# Patient Record
Sex: FEMALE | Race: WHITE | NOT HISPANIC OR LATINO | Employment: FULL TIME | URBAN - METROPOLITAN AREA
[De-identification: names, ages, dates, MRNs, and addresses within clinical notes are randomized per-mention and may not be internally consistent; named-entity substitution may affect disease eponyms.]

---

## 2023-05-10 LAB
BASOPHILS (10*3/UL) IN BLOOD BY AUTOMATED COUNT: 0.06 X10E9/L (ref 0–0.1)
BASOPHILS/100 LEUKOCYTES IN BLOOD BY AUTOMATED COUNT: 0.9 % (ref 0–2)
C REACTIVE PROTEIN (MG/L) IN SER/PLAS: <0.1 MG/DL
EOSINOPHILS (10*3/UL) IN BLOOD BY AUTOMATED COUNT: 0.21 X10E9/L (ref 0–0.7)
EOSINOPHILS/100 LEUKOCYTES IN BLOOD BY AUTOMATED COUNT: 3 % (ref 0–6)
ERYTHROCYTE DISTRIBUTION WIDTH (RATIO) BY AUTOMATED COUNT: 12.4 % (ref 11.5–14.5)
ERYTHROCYTE MEAN CORPUSCULAR HEMOGLOBIN CONCENTRATION (G/DL) BY AUTOMATED: 33 G/DL (ref 32–36)
ERYTHROCYTE MEAN CORPUSCULAR VOLUME (FL) BY AUTOMATED COUNT: 92 FL (ref 80–100)
ERYTHROCYTES (10*6/UL) IN BLOOD BY AUTOMATED COUNT: 4.43 X10E12/L (ref 4–5.2)
HEMATOCRIT (%) IN BLOOD BY AUTOMATED COUNT: 40.6 % (ref 36–46)
HEMOGLOBIN (G/DL) IN BLOOD: 13.4 G/DL (ref 12–16)
IMMATURE GRANULOCYTES/100 LEUKOCYTES IN BLOOD BY AUTOMATED COUNT: 0.4 % (ref 0–0.9)
LEUKOCYTES (10*3/UL) IN BLOOD BY AUTOMATED COUNT: 7 X10E9/L (ref 4.4–11.3)
LYMPHOCYTES (10*3/UL) IN BLOOD BY AUTOMATED COUNT: 2.4 X10E9/L (ref 1.2–4.8)
LYMPHOCYTES/100 LEUKOCYTES IN BLOOD BY AUTOMATED COUNT: 34.2 % (ref 13–44)
MONOCYTES (10*3/UL) IN BLOOD BY AUTOMATED COUNT: 0.51 X10E9/L (ref 0.1–1)
MONOCYTES/100 LEUKOCYTES IN BLOOD BY AUTOMATED COUNT: 7.3 % (ref 2–10)
NEUTROPHILS (10*3/UL) IN BLOOD BY AUTOMATED COUNT: 3.8 X10E9/L (ref 1.2–7.7)
NEUTROPHILS/100 LEUKOCYTES IN BLOOD BY AUTOMATED COUNT: 54.2 % (ref 40–80)
NRBC (PER 100 WBCS) BY AUTOMATED COUNT: 0 /100 WBC (ref 0–0)
PARATHYRIN INTACT (PG/ML) IN SER/PLAS: 32.5 PG/ML (ref 18.5–88)
PLATELETS (10*3/UL) IN BLOOD AUTOMATED COUNT: 230 X10E9/L (ref 150–450)
SEDIMENTATION RATE, ERYTHROCYTE: <1 MM/H (ref 0–20)
THYROTROPIN (MIU/L) IN SER/PLAS BY DETECTION LIMIT <= 0.05 MIU/L: 1.03 MIU/L (ref 0.44–3.98)

## 2023-09-22 PROBLEM — D22.62 MELANOCYTIC NEVI OF LEFT UPPER LIMB, INCLUDING SHOULDER: Status: ACTIVE | Noted: 2023-05-09

## 2023-09-22 PROBLEM — C44.319 BASAL CELL CARCINOMA OF SKIN OF OTHER PARTS OF FACE: Status: ACTIVE | Noted: 2023-05-09

## 2023-09-22 PROBLEM — L90.5 SCAR CONDITION AND FIBROSIS OF SKIN: Status: ACTIVE | Noted: 2023-05-09

## 2023-09-22 PROBLEM — K76.89 LIVER NODULE: Status: ACTIVE | Noted: 2023-09-22

## 2023-09-22 PROBLEM — D48.5 NEOPLASM OF UNCERTAIN BEHAVIOR OF SKIN: Status: ACTIVE | Noted: 2023-05-09

## 2023-09-22 PROBLEM — E04.1 SOLITARY THYROID NODULE: Status: ACTIVE | Noted: 2023-09-22

## 2023-09-22 PROBLEM — M11.20 CALCIUM PYROPHOSPHATE DEPOSITION DISEASE: Status: ACTIVE | Noted: 2023-09-22

## 2023-09-22 PROBLEM — Z86.2 HISTORY OF ANEMIA: Status: ACTIVE | Noted: 2023-09-22

## 2023-09-22 PROBLEM — D18.01 HEMANGIOMA OF SKIN AND SUBCUTANEOUS TISSUE: Status: ACTIVE | Noted: 2023-05-09

## 2023-09-22 PROBLEM — D22.39 MELANOCYTIC NEVI OF OTHER PARTS OF FACE: Status: ACTIVE | Noted: 2023-05-09

## 2023-09-22 PROBLEM — C44.321 SQUAMOUS CELL CARCINOMA OF SKIN OF NOSE: Status: ACTIVE | Noted: 2023-05-09

## 2023-09-22 PROBLEM — D49.2 NEOPLASM OF UNSPECIFIED BEHAVIOR OF BONE, SOFT TISSUE, AND SKIN: Status: ACTIVE | Noted: 2023-05-09

## 2023-09-22 PROBLEM — D22.5 MELANOCYTIC NEVI OF TRUNK: Status: ACTIVE | Noted: 2023-05-09

## 2023-09-22 PROBLEM — Z15.89 HLA B27 (HLA B27 POSITIVE): Status: ACTIVE | Noted: 2023-09-22

## 2023-09-22 PROBLEM — R94.6 ABNORMAL FINDING ON EXAMINATION OF THYROID GLAND: Status: ACTIVE | Noted: 2023-09-22

## 2023-09-22 PROBLEM — C44.612 BASAL CELL CARCINOMA OF SKIN OF RIGHT UPPER LIMB, INCLUDING SHOULDER: Status: ACTIVE | Noted: 2023-05-09

## 2023-09-22 PROBLEM — L81.4 OTHER MELANIN HYPERPIGMENTATION: Status: ACTIVE | Noted: 2023-05-09

## 2023-09-22 PROBLEM — Z85.828 PERSONAL HISTORY OF OTHER MALIGNANT NEOPLASM OF SKIN: Status: ACTIVE | Noted: 2023-05-09

## 2023-09-22 PROBLEM — D22.70 MELANOCYTIC NEVI OF LOWER LIMB, INCLUDING HIP: Status: ACTIVE | Noted: 2023-05-09

## 2023-09-22 PROBLEM — Z87.898 HISTORY OF GROSS HEMATURIA: Status: ACTIVE | Noted: 2023-09-22

## 2023-09-22 PROBLEM — Z79.1 NSAID LONG-TERM USE: Status: ACTIVE | Noted: 2023-09-22

## 2023-09-22 PROBLEM — E89.0 STATUS POST PARTIAL THYROIDECTOMY: Status: ACTIVE | Noted: 2023-09-22

## 2023-09-22 PROBLEM — L82.1 OTHER SEBORRHEIC KERATOSIS: Status: ACTIVE | Noted: 2023-05-09

## 2023-09-22 PROBLEM — Z97.5 IUD (INTRAUTERINE DEVICE) IN PLACE: Status: ACTIVE | Noted: 2023-09-22

## 2023-09-22 PROBLEM — D04.39 CARCINOMA IN SITU OF SKIN OF OTHER PARTS OF FACE: Status: ACTIVE | Noted: 2023-05-09

## 2023-09-22 PROBLEM — M25.50 ARTHRALGIA OF MULTIPLE SITES: Status: ACTIVE | Noted: 2023-09-22

## 2023-09-22 RX ORDER — MELOXICAM 7.5 MG/1
1 TABLET ORAL 2 TIMES DAILY PRN
COMMUNITY
Start: 2021-05-07

## 2023-09-22 RX ORDER — LEVONORGESTREL 52 MG/1
INTRAUTERINE DEVICE INTRAUTERINE
COMMUNITY
Start: 2018-05-14

## 2023-09-22 RX ORDER — ACETAMINOPHEN 500 MG
1 TABLET ORAL DAILY
COMMUNITY
Start: 2022-07-06

## 2023-09-22 RX ORDER — COLCHICINE 0.6 MG/1
1 TABLET ORAL 2 TIMES DAILY
COMMUNITY
Start: 2020-12-28 | End: 2024-03-25 | Stop reason: SDUPTHER

## 2023-10-23 ENCOUNTER — APPOINTMENT (OUTPATIENT)
Dept: RHEUMATOLOGY | Facility: CLINIC | Age: 43
End: 2023-10-23
Payer: COMMERCIAL

## 2023-10-25 ENCOUNTER — TELEPHONE (OUTPATIENT)
Dept: DERMATOLOGY | Facility: CLINIC | Age: 43
End: 2023-10-25
Payer: COMMERCIAL

## 2023-10-25 NOTE — TELEPHONE ENCOUNTER
"Pt called with concerns about surgical site on her right arm done 9/26/23.     Pt left message through [facility] \"Hi - My chart did not give me the option to message Dr. Rene's staff. My R upper arm incision has surrounding erythematous and purulent drainage. Excision of basal cell 9/25/23. I have been applying neosporin for the past 3 days without improvement. No fevers, moving arm fine, some pain at incision site with abduction. Please advise. Thank you.\"    I spoke with pt and her  who is a physician was able to prescribe her mupirocin ointment, she has been using ointment and no further concerns about her wound.    "

## 2023-11-06 ENCOUNTER — OFFICE VISIT (OUTPATIENT)
Dept: DERMATOLOGY | Facility: CLINIC | Age: 43
End: 2023-11-06
Payer: COMMERCIAL

## 2023-11-06 DIAGNOSIS — L81.4 LENTIGO: ICD-10-CM

## 2023-11-06 DIAGNOSIS — D18.01 HEMANGIOMA OF SKIN: ICD-10-CM

## 2023-11-06 DIAGNOSIS — L82.1 SEBORRHEIC KERATOSIS: ICD-10-CM

## 2023-11-06 DIAGNOSIS — Z85.828 PERSONAL HISTORY OF SKIN CANCER: ICD-10-CM

## 2023-11-06 DIAGNOSIS — D22.9 MULTIPLE BENIGN NEVI: Primary | ICD-10-CM

## 2023-11-06 PROCEDURE — 99213 OFFICE O/P EST LOW 20 MIN: CPT | Performed by: DERMATOLOGY

## 2023-11-06 NOTE — PROGRESS NOTES
Subjective     Jess Paniagua is a 43 y.o. female who presents for the following: Skin Check (Personal history of basal cell carcinoma. Personal history of squamous cell carcinoma.).     Review of Systems:  No other skin or systemic complaints other than what is documented elsewhere in the note.    The following portions of the chart were reviewed this encounter and updated as appropriate:  Allergies  Meds  Problems  Med Hx  Surg Hx  Fam Hx         Skin Cancer History  No skin cancer on file.      Specialty Problems          Dermatology Problems    Basal cell carcinoma of skin of other parts of face    Basal cell carcinoma of skin of right upper limb, including shoulder    Carcinoma in situ of skin of other parts of face    Hemangioma of skin and subcutaneous tissue    Melanocytic nevi of left upper limb, including shoulder    Melanocytic nevi of lower limb, including hip    Melanocytic nevi of other parts of face    Melanocytic nevi of trunk    Neoplasm of uncertain behavior of skin    Other melanin hyperpigmentation    Other seborrheic keratosis    Personal history of other malignant neoplasm of skin    Scar condition and fibrosis of skin    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        Objective     Well appearing patient in no apparent distress; mood and affect are within normal limits.    A full examination was performed including scalp, face, neck, chest, abdomen, back, bilateral upper extremities, bilateral lower extremities. Patient declines exam underneath the underwear; patient declines exam of the lower abdomen/mons pubis, buttocks, groin, genitalia, perineal and perianal skin and these areas were not examined. The patient declines removal of the bra today and declines examination of the areas beneath the bra and these areas (portions of the breasts/chest/back) were not examined.     All findings within normal limits unless otherwise noted below.    Assessment/Plan   1. Multiple benign nevi  Brown  and tan macules and papules with reassuring findings on dermoscopy    -These lesions have benign, reassuring patterns on dermoscopy  -Recommend continued self observation, and to contact the office if any changes in nevi are noticed    2. Lentigo  Tan macules    -Benign appearing on exam  -Reassurance, recommend observation    3. Seborrheic keratosis  Stuck on, waxy macule(s)/papule(s)/plaque(s) with comedo-like openings and milia like cysts    -Discussed the nature of the diagnosis  -Reassurance, recommend continued observation    4. Hemangioma of skin  Cherry red papules    -Discussed the nature of the diagnosis  -Reassurance, recommend continued observation    5. Personal history of skin cancer    Personal History of Non-Melanoma Skin Cancer, multiple, most recent 4/2023 BCC R upper arm  -Well healed scar(s) with no evidence of recurrence  -Discussed the need for annual or semi-annual skin examinations and to return sooner if any new or changing lesions are noticed. Patient verbalizes understanding    Related Procedures  Follow Up In Dermatology - Established Patient        Discussed/information given on safe sun practices and use of sunscreen, sun protective clothing or sun avoidance. Recommend to use over the counter medication of sunscreen with a SPF 30 or higher on a daily basis prior to sun exposure to reduce the risk of skin cancer.    Follow up in 6 months for FSE  Discussed if there are any changes or development of concerning symptoms (lesion/skin condition is changing, bleeding, enlarging, or worsening) the patient is to contact my office. The patient verbalizes understanding.     Arabella Ewing MD  11/6/2023

## 2023-12-04 ENCOUNTER — ANCILLARY PROCEDURE (OUTPATIENT)
Dept: RADIOLOGY | Facility: CLINIC | Age: 43
End: 2023-12-04
Payer: COMMERCIAL

## 2023-12-04 DIAGNOSIS — E89.0 POSTPROCEDURAL HYPOTHYROIDISM: ICD-10-CM

## 2023-12-04 PROCEDURE — 76536 US EXAM OF HEAD AND NECK: CPT | Performed by: RADIOLOGY

## 2023-12-04 PROCEDURE — 76536 US EXAM OF HEAD AND NECK: CPT

## 2023-12-13 ENCOUNTER — TELEPHONE (OUTPATIENT)
Dept: SURGERY | Facility: CLINIC | Age: 43
End: 2023-12-13
Payer: COMMERCIAL

## 2023-12-13 NOTE — TELEPHONE ENCOUNTER
Call to patient. No answer. Left voicemail message notifying patient that the results from her most recent thyroid US are stable. Instructed patient to make a follow up visit with Dr. Price. Scheduling phone number provided.

## 2024-01-22 ENCOUNTER — HOSPITAL ENCOUNTER (OUTPATIENT)
Dept: RADIOLOGY | Facility: CLINIC | Age: 44
Discharge: HOME | End: 2024-01-22
Payer: COMMERCIAL

## 2024-01-22 DIAGNOSIS — Z12.31 ENCOUNTER FOR SCREENING MAMMOGRAM FOR MALIGNANT NEOPLASM OF BREAST: ICD-10-CM

## 2024-01-22 PROCEDURE — 77063 BREAST TOMOSYNTHESIS BI: CPT | Performed by: RADIOLOGY

## 2024-01-22 PROCEDURE — 77067 SCR MAMMO BI INCL CAD: CPT

## 2024-01-22 PROCEDURE — 77067 SCR MAMMO BI INCL CAD: CPT | Performed by: RADIOLOGY

## 2024-02-05 ENCOUNTER — APPOINTMENT (OUTPATIENT)
Dept: RHEUMATOLOGY | Facility: CLINIC | Age: 44
End: 2024-02-05
Payer: COMMERCIAL

## 2024-02-14 ENCOUNTER — OFFICE VISIT (OUTPATIENT)
Dept: SURGERY | Facility: CLINIC | Age: 44
End: 2024-02-14
Payer: COMMERCIAL

## 2024-02-14 VITALS
HEIGHT: 68 IN | BODY MASS INDEX: 24.4 KG/M2 | WEIGHT: 161 LBS | HEART RATE: 69 BPM | SYSTOLIC BLOOD PRESSURE: 114 MMHG | DIASTOLIC BLOOD PRESSURE: 74 MMHG | TEMPERATURE: 97.4 F | OXYGEN SATURATION: 100 %

## 2024-02-14 DIAGNOSIS — E04.1 SOLITARY THYROID NODULE: ICD-10-CM

## 2024-02-14 PROCEDURE — 99213 OFFICE O/P EST LOW 20 MIN: CPT | Performed by: SURGERY

## 2024-02-14 PROCEDURE — 1036F TOBACCO NON-USER: CPT | Performed by: SURGERY

## 2024-02-14 ASSESSMENT — PAIN SCALES - GENERAL: PAINLEVEL: 0-NO PAIN

## 2024-02-14 ASSESSMENT — ENCOUNTER SYMPTOMS
DEPRESSION: 0
OCCASIONAL FEELINGS OF UNSTEADINESS: 0
LOSS OF SENSATION IN FEET: 0

## 2024-02-14 ASSESSMENT — PATIENT HEALTH QUESTIONNAIRE - PHQ9
SUM OF ALL RESPONSES TO PHQ9 QUESTIONS 1 & 2: 0
1. LITTLE INTEREST OR PLEASURE IN DOING THINGS: NOT AT ALL
2. FEELING DOWN, DEPRESSED OR HOPELESS: NOT AT ALL

## 2024-02-14 NOTE — PROGRESS NOTES
Subjective   Patient ID: Jess Paniagua is a 43 y.o. female who presents for follow-up of known thyroid nodule.    HPI I saw Mrs. Paniagua back in surgery clinic today.  She returns for a 1 year office visit.  In July 2022 when we initially saw her we biopsied her dominant right-sided thyroid nodule which was 1.7 cm TI-RADS 3.  This came back benign.  She then got a 6-month follow-up ultrasound which was stable.  Based on that we recommended moving her out to a 1 year follow-up.    Her most recent ultrasound completed prior to today's appointment still shows a right-sided thyroid nodule.  Previously it was 1.7 x 0.8 x 1.4 cm and is now slightly smaller at 1.5 x 0.7 x 1.0 cm in size.  It remains TI-RADS 3.  No new nodules were seen.    She denies any changes in her neck.  No pain no pressure no difficulty breathing or swallowing no troubles with her voice.    Only change in her past medical history was that she had a basal cell carcinoma removed.      Objective   Physical Exam  Vitals reviewed.   Constitutional:       Appearance: Normal appearance.   Neck:      Comments: Palpable 1 cm nodule right thyroid lobe which is only felt upon swallowing.  Trachea midline.  No palpable nodules on the left.  Lymphadenopathy:      Cervical: No cervical adenopathy.   Neurological:      Mental Status: She is alert.         Assessment/Plan    Mrs. Paniagua has a known history of nodular thyroid disease.  She has a biopsy-proven benign nodule in the right thyroid lobe from July 2022.  On physical exam today and on her most recent ultrasound her nodule remained stable to perhaps slightly smaller.  I reviewed the full report with her.  No additional biopsies are required.    Plan    1.  We will continue with ongoing ultrasound surveillance of her nodule.  I will order an ultrasound for her in 1 year and she can see me back after that has been completed.         Marcos Price MD 02/14/24 9:22 AM

## 2024-03-20 ENCOUNTER — APPOINTMENT (OUTPATIENT)
Dept: RHEUMATOLOGY | Facility: CLINIC | Age: 44
End: 2024-03-20
Payer: COMMERCIAL

## 2024-03-25 ENCOUNTER — APPOINTMENT (OUTPATIENT)
Dept: RHEUMATOLOGY | Facility: CLINIC | Age: 44
End: 2024-03-25
Payer: COMMERCIAL

## 2024-03-25 ENCOUNTER — OFFICE VISIT (OUTPATIENT)
Dept: RHEUMATOLOGY | Facility: CLINIC | Age: 44
End: 2024-03-25
Payer: COMMERCIAL

## 2024-03-25 ENCOUNTER — LAB (OUTPATIENT)
Dept: LAB | Facility: LAB | Age: 44
End: 2024-03-25
Payer: COMMERCIAL

## 2024-03-25 VITALS
BODY MASS INDEX: 23.26 KG/M2 | DIASTOLIC BLOOD PRESSURE: 69 MMHG | HEART RATE: 60 BPM | WEIGHT: 153 LBS | SYSTOLIC BLOOD PRESSURE: 112 MMHG

## 2024-03-25 DIAGNOSIS — M46.90 AXIAL SPONDYLOARTHRITIS (CMS-HCC): ICD-10-CM

## 2024-03-25 DIAGNOSIS — Z79.899 ENCOUNTER FOR LONG-TERM (CURRENT) USE OF HIGH-RISK MEDICATION: Primary | ICD-10-CM

## 2024-03-25 DIAGNOSIS — Z79.899 ENCOUNTER FOR LONG-TERM (CURRENT) USE OF HIGH-RISK MEDICATION: ICD-10-CM

## 2024-03-25 DIAGNOSIS — M11.20 CALCIUM PYROPHOSPHATE DEPOSITION DISEASE: ICD-10-CM

## 2024-03-25 LAB
ALBUMIN SERPL BCP-MCNC: 4.3 G/DL (ref 3.4–5)
ALP SERPL-CCNC: 59 U/L (ref 33–110)
ALT SERPL W P-5'-P-CCNC: 18 U/L (ref 7–45)
ANION GAP SERPL CALC-SCNC: 11 MMOL/L (ref 10–20)
AST SERPL W P-5'-P-CCNC: 16 U/L (ref 9–39)
BASOPHILS # BLD AUTO: 0.05 X10*3/UL (ref 0–0.1)
BASOPHILS NFR BLD AUTO: 0.7 %
BILIRUB SERPL-MCNC: 0.8 MG/DL (ref 0–1.2)
BUN SERPL-MCNC: 20 MG/DL (ref 6–23)
CALCIUM SERPL-MCNC: 9.7 MG/DL (ref 8.6–10.6)
CHLORIDE SERPL-SCNC: 108 MMOL/L (ref 98–107)
CHOLEST SERPL-MCNC: 139 MG/DL (ref 0–199)
CHOLESTEROL/HDL RATIO: 2.3
CO2 SERPL-SCNC: 28 MMOL/L (ref 21–32)
CREAT SERPL-MCNC: 0.8 MG/DL (ref 0.5–1.05)
CRP SERPL-MCNC: <0.1 MG/DL
EGFRCR SERPLBLD CKD-EPI 2021: >90 ML/MIN/1.73M*2
EOSINOPHIL # BLD AUTO: 0.18 X10*3/UL (ref 0–0.7)
EOSINOPHIL NFR BLD AUTO: 2.5 %
ERYTHROCYTE [DISTWIDTH] IN BLOOD BY AUTOMATED COUNT: 12.6 % (ref 11.5–14.5)
ERYTHROCYTE [SEDIMENTATION RATE] IN BLOOD BY WESTERGREN METHOD: <1 MM/H (ref 0–20)
GLUCOSE SERPL-MCNC: 83 MG/DL (ref 74–99)
HAV IGM SER QL: NONREACTIVE
HBV CORE IGM SER QL: NONREACTIVE
HBV SURFACE AG SERPL QL IA: NONREACTIVE
HCT VFR BLD AUTO: 41.7 % (ref 36–46)
HCV AB SER QL: NONREACTIVE
HDLC SERPL-MCNC: 60.4 MG/DL
HGB BLD-MCNC: 14 G/DL (ref 12–16)
IMM GRANULOCYTES # BLD AUTO: 0.02 X10*3/UL (ref 0–0.7)
IMM GRANULOCYTES NFR BLD AUTO: 0.3 % (ref 0–0.9)
LYMPHOCYTES # BLD AUTO: 2.13 X10*3/UL (ref 1.2–4.8)
LYMPHOCYTES NFR BLD AUTO: 29.2 %
MCH RBC QN AUTO: 30.2 PG (ref 26–34)
MCHC RBC AUTO-ENTMCNC: 33.6 G/DL (ref 32–36)
MCV RBC AUTO: 90 FL (ref 80–100)
MONOCYTES # BLD AUTO: 0.53 X10*3/UL (ref 0.1–1)
MONOCYTES NFR BLD AUTO: 7.3 %
NEUTROPHILS # BLD AUTO: 4.38 X10*3/UL (ref 1.2–7.7)
NEUTROPHILS NFR BLD AUTO: 60 %
NON-HDL CHOLESTEROL: 79 MG/DL (ref 0–149)
NRBC BLD-RTO: 0 /100 WBCS (ref 0–0)
PLATELET # BLD AUTO: 230 X10*3/UL (ref 150–450)
POTASSIUM SERPL-SCNC: 4.5 MMOL/L (ref 3.5–5.3)
PROT SERPL-MCNC: 6.6 G/DL (ref 6.4–8.2)
RBC # BLD AUTO: 4.63 X10*6/UL (ref 4–5.2)
SODIUM SERPL-SCNC: 142 MMOL/L (ref 136–145)
WBC # BLD AUTO: 7.3 X10*3/UL (ref 4.4–11.3)

## 2024-03-25 PROCEDURE — 86140 C-REACTIVE PROTEIN: CPT

## 2024-03-25 PROCEDURE — 80074 ACUTE HEPATITIS PANEL: CPT

## 2024-03-25 PROCEDURE — 85652 RBC SED RATE AUTOMATED: CPT

## 2024-03-25 PROCEDURE — 80053 COMPREHEN METABOLIC PANEL: CPT

## 2024-03-25 PROCEDURE — 99214 OFFICE O/P EST MOD 30 MIN: CPT | Performed by: INTERNAL MEDICINE

## 2024-03-25 PROCEDURE — 82465 ASSAY BLD/SERUM CHOLESTEROL: CPT

## 2024-03-25 PROCEDURE — 85025 COMPLETE CBC W/AUTO DIFF WBC: CPT

## 2024-03-25 PROCEDURE — 1036F TOBACCO NON-USER: CPT | Performed by: INTERNAL MEDICINE

## 2024-03-25 PROCEDURE — 83718 ASSAY OF LIPOPROTEIN: CPT

## 2024-03-25 RX ORDER — SULFASALAZINE 500 MG/1
1000 TABLET, DELAYED RELEASE ORAL 2 TIMES DAILY
Qty: 240 TABLET | Refills: 1 | Status: SHIPPED | OUTPATIENT
Start: 2024-03-25

## 2024-03-25 RX ORDER — COLCHICINE 0.6 MG/1
0.6 TABLET ORAL DAILY
Qty: 90 TABLET | Refills: 1 | Status: SHIPPED | OUTPATIENT
Start: 2024-03-25

## 2024-03-25 ASSESSMENT — BATH ANKYLOSING SPONDYLITIS DISEASE ACTIVITY INDEX (BASDAI)
AVG_Q5_Q6: 0
LENGTH_OF_MORNING_STIFFNESS: 0 HOURS
DISCOMFORT_TOUCH_SENSITIVE_AREAS: 7
SUM_Q1_TO_Q4: 19
SEVERITY_OF_MORNING_STIFFNESS: 0 - NONE
TOTAL_SCORE: 3.8
FATIGUE_TIREDNESS_LEVEL: 3
PAIN_SWELLING_OUTSIDE_NHB: 0- NONE
NECK_HIP_BACK_PAIN_OR_SWELLING: 9

## 2024-03-25 ASSESSMENT — PAIN SCALES - GENERAL: PAINLEVEL: 4

## 2024-03-25 NOTE — PROGRESS NOTES
Study short name: AS S100.   Type of Visit:   Research.   -----------------------------------   This note is created in accordance with Standard Operating Procedures for Research Studies at [facility] which can be found on the intranet at: [email or web address]         Project Title: Investigating S100 proteins expression and inflammatory pathways with coronary artery atherosclerosis in patients with ankylosing spondylitis  Submission Number: [id number]  : Lisa Mckeon MD    Questionnaire completed and tests ordered.

## 2024-03-25 NOTE — PROGRESS NOTES
Recall    39 yo F who dates the history back to your 20's when she started with knee effusion and SI joint pain.   She was initially diagnosed with plantar fasciitis and received cortisone injection.  She also noticed one flare involving her knees associated with significant swelling. Was getting intermittent flares and took Ibuprofen which helped the symptoms.   She saw Dr. Rodriguez in 2020 as symptoms got worse. She was having worsening pain in her heels, and lateral hip pain and other joints.   Had workup which revealed positive HLA-B27. 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   Her symptoms would be worse in the morning with stiffness. Prolonged sitting made the pain worse.   Was told has CPPD disease and was started on colchicine and Naproxen. Lateral switched to meloxicam.      NO family history of SpA.   Has no H/O psoriasis or uveitis or IBD.   Had Xray of the SI joints in 2019 which were normal. MRI of the SI joint is no edema    Chief Complaint: Follow up of CPPD and axial SpA          HPI: At today's visit, reports no morning stiffness. Her main complaint is pain in plantar fascia. Has heel pain is 9.  Back pain is 7-8. Next day feels fine. Does not wake up with back pain. Back pain is better with activity.   Taking Meloxicam 7.5 mg BID.  No knee effusions.                Review of Systems  Constitutional: Denies fatigue  Head: Headaches have improved  Eyes: Denies dry eyes, blurry vision, redness of the eyes, pain in the eyes or H/O uveitis.  ENT: Denies dry mouth, loss of taste, sores in the mouth, or difficulty swallowing  Cardiovascular: Denies chest pain, palpitations  Respiratory: Denies shortness of breath or cough or wheezing  Gastrointestinal: Denies nausea, vomiting, heartburn, abdominal pain , diarrhea or blood in the stool  Integumentary: Denies photosensitivity, rash or lesions, Raynaud's or  psoriasis  Neurological: Denies any numbness or tingling or weakness  Hematologic/Lymphatic: Denies bleeding, bruising, Raynaud's phenomenon  MSK: As per HPI.            Active Problems  Problems    · Abnormal finding on examination of thyroid gland (246.9) (R94.6)   · Arthralgia of multiple sites (719.49) (M25.50)   · Blood tests for routine general physical examination (V72.62) (Z00.00)   · Body mass index (BMI) of 23.0 to 23.9 in adult (V85.1) (Z68.23)   · COVID-19 virus infection (079.89) (U07.1)   · Encounter for screening mammogram for malignant neoplasm of breast (V76.12)  (Z12.31)   · History of anemia (V12.3) (Z86.2)   · History of gross hematuria (V13.09) (Z87.898)   · IUD (intrauterine device) in place (V45.51) (Z97.5)   · Liver nodule (573.8) (K76.89)   · Low back pain (724.2) (M54.50)   · NSAID long-term use (V58.64) (Z79.1)   · Pseudogout (275.49,712.20) (M11.20)   · Sacroiliac pain (724.6) (M53.3)   · Solitary thyroid nodule (241.0) (E04.1)   · 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)   · Swelling of left knee joint (719.06) (M25.462)   · Swelling of right knee joint (719.06) (M25.461)   · Well female exam with routine gynecological exam (V72.31) (Z01.419)     Past Medical History  Problems    · History of Acute right lower quadrant pain (789.03,338.19) (R10.31)   · Resolved Date: 28 Feb 2019   · History of anemia (V12.3) (Z86.2)   · Resolved Date: 01 Jun 2022   · History of hematuria (V13.09) (Z87.448)   · Resolved Date: 28 Feb 2019   · History of left flank pain (V13.89) (Z87.898)   · Resolved Date: 28 Feb 2019   · History of thyroid disorder (V12.29) (Z86.39)   · Resolved Date: 10 Dec 2018   · History of IUD complication (996.76) (T83.9XXA)   · Resolved Date: 10 Dec 2018   · History of Right foot pain (729.5) (M79.671)   · Resolved Date: 10 Dec 2018   · History of Well woman exam with routine gynecological exam (V72.31) (Z01.419)   · Resolved Date: 10 Dec 2018     Surgical  History  Problems    · History of Subtotal thyroidectomy     Family History  Mother    · Family history of Alcohol addiction   · Family history of atrial fibrillation (V17.49) (Z82.49)   · Family history of Osteopoikilosis   · Family history of Other secondary osteoarthritis of left hip  Father    · Family history of Accident   · Family history of sarcoidosis (V19.8) (Z83.2)  Maternal Grandmother    · Family history of Bipolar depression   · Family history of malignant neoplasm of gallbladder (V16.0) (Z80.0)   · Family history of Muscle disease  Paternal Grandmother    · Family history of malignant neoplasm of gallbladder (V16.0) (Z80.0)  Maternal Grandfather    · Family history of Bipolar depression   · Family history of malignant neoplasm of gallbladder (V16.0) (Z80.0)   · Family history of type 2 diabetes mellitus (V18.0) (Z83.3)   · Family history of Muscle disease  Paternal Grandfather    · Family history of Bipolar depression   · Family history of malignant neoplasm of gallbladder (V16.0) (Z80.0)     Social History  Problems    · Consumes alcohol occasionally (V49.89) (Z78.9)   · IUD (intrauterine device) in place (V45.51) (Z97.5)   · Never a smoker   · No illicit drug use     Allergies  Medication    · Compazine CPCR   Updated By: Scarlet Melendez; 7/6/2022 9:24:39 AM  NonMedication    · Pollens Weeds   Recorded By: Jaimie Cisneros; 12/6/2018 8:12:29 AM     Current Meds     Current Outpatient Medications   Medication Sig Dispense Refill    cholecalciferol (Vitamin D-3) 50 mcg (2,000 unit) capsule Take 1 capsule (50 mcg) by mouth once daily.      colchicine, gout, 0.6 mg tablet Take 1 tablet (0.6 mg) by mouth 2 times a day. As directed.      levonorgestrel (Mirena) 21 mcg/24 hours (8 yrs) 52 mg IUD Inserted 5/14/2018 by Dr. Savage BUSCH      meloxicam (Mobic) 7.5 mg tablet Take 1 tablet (7.5 mg) by mouth 2 times a day as needed.      vitamin B complex (B-COMPLEX ORAL) Take 1 capsule by mouth once daily.       No  current facility-administered medications for this visit.        Vitals  Blood pressure 112/69, pulse 60, weight 69.4 kg (153 lb).   Physical Exam  General - NAD, sitting up in chair, well-groomed, pleasant, AAOx3  Head: Normocephalic, atraumatic  Eyes - PERRLA, EOMI. No conjunctiva injection.   Mouth/ENT - Moist oral and nasal mucosa. No facial rash. No enlarged parotid or submandibular gland. Adequate salivary pooling.  Cardiovascular - Normal S1, S2. Regular rate and rhythm. No murmurs or rubs.  Lungs - Symmetric chest expansion. Clear to auscultation bilaterally.   Skin - No rashes or ulcers. Skin warm and dry. No erythema on bilateral cheeks.  Extremities - No edema, cyanosis ,or clubbing  Musculoskeletal -  EXAMJOINTDETAILED,  Shoulders: Full ROM, without pain, no swelling, warmth or tenderness.  Elbows: Full ROM, without pain, no swelling, warmth or tenderness.  Wrists: Full ROM, without pain, no swelling, warmth or tenderness.  MCP: No swelling, warmth or tenderness. Metacarpal squeeze negative  PIP: No swelling, warmth or tenderness.  DIP: No swelling, warmth or tenderness.  Hands : 5/5.    Sacroiliac joints: No local tenderness. JUAN negative.   Hips: Full ROM.  No malalignment.  Knees:  Full ROM, without pain, no swelling, warmth or point tenderness. No joint line tenderness, no pes anserine tenderness.  Ankles: Full ROM, without pain, swelling, warmth or tenderness. Left plantar fascia tenderness  Toes: No swelling, warmth or tenderness. Metatarsal squeeze negative  Lumbar spine: No tenderness or limitation of movement    CT of the abdomen from February 1, 2007 as well as CT of the abdomen  and pelvis from February 6, 2018     Lumbar spine and sacroiliac joints performed February 28, 2019     ACCESSION NUMBER(S):  [id number]     ORDERING CLINICIAN:  MAHESH MCGRATH     TECHNIQUE:  Routine multiplanar multisequential MRI of the sacrum without  contrast.     FINDINGS:  Evaluation of the sacroiliac joints  demonstrates no evidence of  significant erosive change. The sacroiliac joint spaces are normal.     There is no evidence of marrow edema on either side of the sacroiliac  joints.     No sacroiliac fluid.     No ankylosis.     Presacral soft tissues are unremarkable.     There is no evidence of significant marrow edema.     Marrow replacement process.     Evaluation of the lumbar spine is partially shows disc desiccation at  the L4-5 and L5-S1 levels. Some likely vacuum phenomenon at L5-S1  with a small disc bulge.     Again note is made of a fibroid uterus with an IUD.        Impression  No findings suggestive of sacroiliitis.     Some degenerative changes at L4-5 and L5-S1 with a mild L5-S1 disc  bulge.     Fibroid uterus with IUD.    Component      Latest Ref Rng 5/9/2023   Sed Rate      0 - 20 mm/h <1    C-Reactive Protein      mg/dL <0.10    Thyroid Stimulating Hormone      0.44 - 3.98 mIU/L 1.03    Parathyroid Hormone, Intact      18.5 - 88.0 pg/mL 32.5          43 yr old with history of IBP, and plantar fascitis. She is HLA-B27 positive and also has CPPD in knee, wrist cartilage. Xray and MRI of the SI showed no sacroillitis.  Discussed about starting on sulfasalazine 500 mg BID. BASDAI is 3.8  Labs ordered.   CPPD: Continue colchicine. Reduce to every other day.     Reviewed and approved by MAHESH MCGRATH on 3/25/24 at 11:56 AM.

## 2024-04-29 DIAGNOSIS — Q66.6 CONGENITAL HINDFOOT VALGUS: ICD-10-CM

## 2024-04-29 DIAGNOSIS — M21.41 PES PLANUS OF BOTH FEET: ICD-10-CM

## 2024-04-29 DIAGNOSIS — M72.2 PLANTAR FASCIITIS, BILATERAL: Primary | ICD-10-CM

## 2024-04-29 DIAGNOSIS — M21.42 PES PLANUS OF BOTH FEET: ICD-10-CM

## 2024-05-03 ENCOUNTER — TREATMENT (OUTPATIENT)
Dept: PHYSICAL THERAPY | Facility: CLINIC | Age: 44
End: 2024-05-03
Payer: COMMERCIAL

## 2024-05-03 DIAGNOSIS — M72.2 PLANTAR FASCIITIS: Primary | ICD-10-CM

## 2024-05-03 DIAGNOSIS — M72.2 PLANTAR FASCIITIS, BILATERAL: ICD-10-CM

## 2024-05-03 DIAGNOSIS — M21.42 PES PLANUS OF BOTH FEET: ICD-10-CM

## 2024-05-03 DIAGNOSIS — M21.41 PES PLANUS OF BOTH FEET: ICD-10-CM

## 2024-05-03 DIAGNOSIS — Q66.6 CONGENITAL HINDFOOT VALGUS: ICD-10-CM

## 2024-05-03 DIAGNOSIS — Q66.6 CONGENITAL VALGUS DEFORMITY OF FOOT: ICD-10-CM

## 2024-05-03 PROCEDURE — L3030 FOOT ARCH SUPPORT REMOV PREM: HCPCS | Performed by: SPECIALIST/TECHNOLOGIST

## 2024-05-03 ASSESSMENT — PAIN - FUNCTIONAL ASSESSMENT: PAIN_FUNCTIONAL_ASSESSMENT: 0-10

## 2024-05-03 NOTE — PROGRESS NOTES
Physical Therapy  Physical Therapy Treatment    Patient Name: Jess Paniagua  MRN: [id number]  Today's Date: 5/3/2024  Time Calculation  Start Time: 1305  Stop Time: 1335  Time Calculation (min): 30 min    Current Problem  1. Plantar fasciitis        2. Congenital valgus deformity of foot        3. Plantar fasciitis, bilateral  Referral to Physical Therapy      4. Congenital hindfoot valgus  Referral to Physical Therapy      5. Pes planus of both feet  Referral to Physical Therapy          Precautions  Precautions  Precautions Comment: none  Pain  Pain Assessment: 0-10  Pain Score:  (varies with activity)    Insurance:   Visit: 1 of [facility]  Authorization: No auth needed   [employer]      Subjective:   Subjective   Patient reports having plantar fascial pain when running.  Patient got new suresteps without changing symptoms.  Dr. Griffiths referred for foot orthotics.    Objective:   B pronation  B borja's toe         Treatments:     fabricated Foot Support tech lady's size 11 (Fabrifit/XRD) with thermal cork add on  reviewed wear and care directions  reviewed modes of failure     Charges:  FT x2 (cq)     Assessment:   Patient noted support although still painful with jogging.        Plan: Discharge with new orthotics.  Monitor wear and adjust if necessary.        Won Haley, PTA

## 2024-06-24 ENCOUNTER — APPOINTMENT (OUTPATIENT)
Dept: RHEUMATOLOGY | Facility: CLINIC | Age: 44
End: 2024-06-24
Payer: COMMERCIAL

## 2024-07-08 ENCOUNTER — APPOINTMENT (OUTPATIENT)
Dept: RHEUMATOLOGY | Facility: CLINIC | Age: 44
End: 2024-07-08
Payer: COMMERCIAL

## 2024-07-08 VITALS
SYSTOLIC BLOOD PRESSURE: 118 MMHG | HEART RATE: 69 BPM | WEIGHT: 155 LBS | DIASTOLIC BLOOD PRESSURE: 85 MMHG | HEIGHT: 68 IN | BODY MASS INDEX: 23.49 KG/M2 | TEMPERATURE: 97.9 F

## 2024-07-08 DIAGNOSIS — M11.20 CALCIUM PYROPHOSPHATE DEPOSITION DISEASE: ICD-10-CM

## 2024-07-08 DIAGNOSIS — M46.90 AXIAL SPONDYLOARTHRITIS (CMS-HCC): ICD-10-CM

## 2024-07-08 PROCEDURE — 99214 OFFICE O/P EST MOD 30 MIN: CPT | Performed by: INTERNAL MEDICINE

## 2024-07-08 PROCEDURE — 1036F TOBACCO NON-USER: CPT | Performed by: INTERNAL MEDICINE

## 2024-07-08 RX ORDER — SULFASALAZINE 500 MG/1
1000 TABLET, DELAYED RELEASE ORAL 2 TIMES DAILY
Qty: 240 TABLET | Refills: 1 | Status: SHIPPED | OUTPATIENT
Start: 2024-07-08

## 2024-07-08 RX ORDER — COLCHICINE 0.6 MG/1
0.6 TABLET ORAL DAILY
Qty: 90 TABLET | Refills: 1 | Status: SHIPPED | OUTPATIENT
Start: 2024-07-08

## 2024-07-08 ASSESSMENT — PAIN SCALES - GENERAL: PAINLEVEL: 0-NO PAIN

## 2024-07-08 NOTE — PROGRESS NOTES
Recall     41 yo F who dates the history back to her 20's when she started with knee effusion and SI joint pain.   She was initially diagnosed with plantar fasciitis and received cortisone injection.  She also noticed one flare involving her knees associated with significant swelling. Was getting intermittent flares and took Ibuprofen which helped the symptoms.   She saw Dr. Rodriguez in 2020 as symptoms got worse. She was having worsening pain in her heels, and lateral hip pain and other joints.   Had workup which revealed positive HLA-B27. 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   Her symptoms would be worse in the morning with stiffness. Prolonged sitting made the pain worse.   Was told has CPPD disease and was started on colchicine and Naproxen. Lateral switched to meloxicam.      NO family history of SpA.   Has no H/O psoriasis or uveitis or IBD.   Had Xray of the SI joints in 2019 which were normal. MRI of the SI joint is no edema  Started on sulfasalazine 500 mg BID 3/24. Colchicine reduced to every other day.        Chief Complaint: Follow up of CPPD and axial SpA           HPI: At previous visit visit, reported no morning stiffness. She continues to have pain in the plantar fascia.Taking Meloxicam 7.5 mg BID. Has heel pain is 9.  No back pain.  No joint swelling.   No flare of pseudogout.         Patient Active Problem List   Diagnosis    Abnormal finding on examination of thyroid gland    Melanocytic nevi of lower limb, including hip    IUD (intrauterine device) in place    Liver nodule    Melanocytic nevi of left upper limb, including shoulder    HLA B27 (HLA B27 positive)    Hemangioma of skin and subcutaneous tissue    Carcinoma in situ of skin of other parts of face    Calcium pyrophosphate deposition disease    Basal cell carcinoma of skin of right upper limb, including shoulder    Basal cell carcinoma of skin of other  parts of face    Arthralgia of multiple sites    Melanocytic nevi of trunk    Melanocytic nevi of other parts of face    Neoplasm of uncertain behavior of skin    Neoplasm of unspecified behavior of bone, soft tissue, and skin    Other melanin hyperpigmentation    Other seborrheic keratosis    Personal history of other malignant neoplasm of skin    Scar condition and fibrosis of skin    Solitary thyroid nodule    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    Status post partial thyroidectomy    History of anemia    History of gross hematuria    NSAID long-term use    Plantar fasciitis    Congenital valgus deformity of foot         Past Medical History:   Diagnosis Date    Encounter for gynecological examination (general) (routine) without abnormal findings     Well woman exam with routine gynecological exam    Pain in right foot 09/21/2017    Right foot pain    Personal history of diseases of the blood and blood-forming organs and certain disorders involving the immune mechanism 06/01/2022    History of anemia    Personal history of other diseases of urinary system 12/10/2018    History of hematuria    Personal history of other endocrine, nutritional and metabolic disease 12/06/2018    History of thyroid disorder    Personal history of other specified conditions 02/28/2019    History of left flank pain    Right lower quadrant pain 02/28/2019    Acute right lower quadrant pain    Unspecified complication of genitourinary prosthetic device, implant and graft, initial encounter ([facility]) 12/07/2018    IUD complication            Past Surgical History:   Procedure Laterality Date    OTHER SURGICAL HISTORY  12/06/2018    Subtotal thyroidectomy       Allergies   Allergen Reactions    Prochlorperazine Unknown and Other       Medication Documentation Review Audit       Reviewed by Marcos Price MD (Physician) on 02/14/24 at 0933      Medication Order Taking? Sig Documenting Provider Last Dose Status   cholecalciferol  (Vitamin D-3) 50 mcg (2,000 unit) capsule [id number] Yes Take 1 capsule (50 mcg) by mouth once daily. Historical Provider, MD Taking Active   colchicine, gout, 0.6 mg tablet [id number] Yes Take 1 tablet (0.6 mg) by mouth 2 times a day. As directed. Historical Provider, MD Taking Active   levonorgestrel (Mirena) 21 mcg/24 hours (8 yrs) 52 mg IUD [id number] Yes Inserted 5/14/2018 by Dr. Savage BUSCH Historical Provider, MD Taking Active   meloxicam (Mobic) 7.5 mg tablet [id number] Yes Take 1 tablet (7.5 mg) by mouth 2 times a day as needed. Historical Provider, MD Taking Active   vitamin B complex (B-COMPLEX ORAL) [id number] Yes Take 1 capsule by mouth once daily. Historical Provider, MD Taking Active                        Family History   Problem Relation Name Age of Onset    Alcohol abuse Mother      Atrial fibrillation Mother      Other (Osteopoikilosis) Mother      Osteoarthritis Mother      Other (Accident) Father      Sarcoidosis Father      Bipolar disorder Maternal Grandmother          Depression    Cancer Maternal Grandmother          Gallbladder    Other (Muscle disease) Maternal Grandmother      Bipolar disorder Maternal Grandfather          Depression    Cancer Maternal Grandfather          Gallbladder    Diabetes type II Maternal Grandfather      Other (Muscle disease) Maternal Grandfather      Cancer Paternal Grandmother          Gallbladder    Bipolar disorder Paternal Grandfather          Depression    Cancer Paternal Grandfather          Gallbladder          Social History     Tobacco Use    Smoking status: Never    Smokeless tobacco: Never   Substance Use Topics    Alcohol use: Yes     Alcohol/week: 3.0 standard drinks of alcohol     Types: 3 Glasses of wine per week    Drug use: Never         Review of Systems  Constitutional: No fatigue  Skin:  No rash or psoriasis or photosensitvity  Head: No headache, oral ulcers or hair loss  Neck: No difficulty swallowing or choking  Eyes: No dry eyes or iritis  Mouth:  "No dry mouth  or oral ulcers  Pulmonary: No wheezing, pleurisy or SOB  Cardiovascular: No chest pain or palpitations  Gastrointestinal: No abdominal pain, nausea, heartburn, or blood in stool  Endocrine: No Raynaud's   Musculoskeletal: As H/P    Current Outpatient Medications   Medication Sig Dispense Refill    cholecalciferol (Vitamin D-3) 50 mcg (2,000 unit) capsule Take 1 capsule (50 mcg) by mouth once daily.      colchicine 0.6 mg tablet Take 1 tablet (0.6 mg) by mouth once daily. As directed. 90 tablet 1    levonorgestrel (Mirena) 21 mcg/24 hours (8 yrs) 52 mg IUD Inserted 5/14/2018 by Dr. Savage BUSCH      meloxicam (Mobic) 7.5 mg tablet Take 1 tablet (7.5 mg) by mouth 2 times a day as needed.      sulfaSALAzine (Azulfidine EN-tabs) 500 mg DR tablet Take 2 tablets (1,000 mg) by mouth 2 times a day. Do not crush, chew, or split. 240 tablet 1    vitamin B complex (B-COMPLEX ORAL) Take 1 capsule by mouth once daily.       No current facility-administered medications for this visit.           PHYSICAL EXAM:  Blood pressure 118/85, pulse 69, temperature 36.6 °C (97.9 °F), height 1.727 m (5' 8\"), weight 70.3 kg (155 lb).  General - NAD, sitting up in chair, well-groomed, pleasant, AAOx3  Head: Normocephalic, atraumatic  Eyes - PERRLA, EOMI. No conjunctiva injection.   Mouth/ENT - Moist oral and nasal mucosa. No facial rash. No enlarged parotid or submandibular gland. Adequate salivary pooling.  Cardiovascular - Normal S1, S2. Regular rate and rhythm. No murmurs or rubs.  Lungs - Symmetric chest expansion. Clear to auscultation bilaterally.   Skin - No rashes or ulcers. Skin warm and dry. No erythema on bilateral cheeks.  Abdomen - Soft, non-tender. No masses. Normal bowel sounds.  Extremities - No edema, cyanosis ,or clubbing  Neurological - Alert and oriented x 3,  grossly intact. No focal deficit.  Musculoskeletal -  EXAMJOINTDETAILED,  Shoulders: Full ROM, without pain, no swelling, warmth or tenderness.  Elbows: " Full ROM, without pain, no swelling, warmth or tenderness.  Wrists: Full ROM, without pain, no swelling, warmth or tenderness.  MCP: No swelling, warmth or tenderness. Metacarpal squeeze negative  PIP: No swelling, warmth or tenderness.  DIP: No swelling, warmth or tenderness.  Hands : 5/5.    Feet: No tenderness    Component      Latest Ref [facility] 3/25/2024   LEUKOCYTES (10*3/UL) IN BLOOD BY AUTOMATED COUNT, [place]      4.4 - 11.3 x10*3/uL 7.3    nRBC      0.0 - 0.0 /100 WBCs 0.0    ERYTHROCYTES (10*6/UL) IN BLOOD BY AUTOMATED COUNT, Irish      4.00 - 5.20 x10*6/uL 4.63    HEMOGLOBIN      12.0 - 16.0 g/dL 14.0    HEMATOCRIT      36.0 - 46.0 % 41.7    MCV      80 - 100 fL 90    MCH      26.0 - 34.0 pg 30.2    MCHC      32.0 - 36.0 g/dL 33.6    RED CELL DISTRIBUTION WIDTH      11.5 - 14.5 % 12.6    PLATELETS (10*3/UL) IN BLOOD AUTOMATED COUNT, Irish      150 - 450 x10*3/uL 230    NEUTROPHILS/100 LEUKOCYTES IN BLOOD BY AUTOMATED COUNT, Irish      40.0 - 80.0 % 60.0    Immature Granulocytes %, Automated      0.0 - 0.9 % 0.3    Lymphocytes %      13.0 - 44.0 % 29.2    Monocytes %      2.0 - 10.0 % 7.3    Eosinophils %      0.0 - 6.0 % 2.5    Basophils %      0.0 - 2.0 % 0.7    NEUTROPHILS (10*3/UL) IN BLOOD BY AUTOMATED COUNT, Irish      1.20 - 7.70 x10*3/uL 4.38    Immature Granulocytes Absolute, Automated      0.00 - 0.70 x10*3/uL 0.02    Lymphocytes Absolute      1.20 - 4.80 x10*3/uL 2.13    Monocytes Absolute      0.10 - 1.00 x10*3/uL 0.53    Eosinophils Absolute      0.00 - 0.70 x10*3/uL 0.18    Basophils Absolute      0.00 - 0.10 x10*3/uL 0.05    GLUCOSE      74 - 99 mg/dL 83    SODIUM      136 - 145 mmol/L 142    POTASSIUM      3.5 - 5.3 mmol/L 4.5    CHLORIDE      98 - 107 mmol/L 108 (H)    Bicarbonate      21 - 32 mmol/L 28    Anion Gap      10 - 20 mmol/L 11    Blood Urea Nitrogen      6 - 23 mg/dL 20    Creatinine      0.50 - 1.05 mg/dL 0.80    EGFR      >60 mL/min/1.73m*2 >90    Calcium       8.6 - 10.6 mg/dL 9.7    Albumin      3.4 - 5.0 g/dL 4.3    Alkaline Phosphatase      33 - 110 U/L 59    Total Protein      6.4 - 8.2 g/dL 6.6    AST      9 - 39 U/L 16    Bilirubin Total      0.0 - 1.2 mg/dL 0.8    ALT      7 - 45 U/L 18    CHOLESTEROL      0 - 199 mg/dL 139    HDL CHOLESTEROL      mg/dL 60.4    Cholesterol/HDL Ratio 2.3    Non-HDL Cholesterol      0 - 149 mg/dL 79    C-Reactive Protein      <1.00 mg/dL <0.10    Sed Rate      0 - 20 mm/h <1           44 yr old with history of IBP, and plantar fascitis. She is HLA-B27 positive and also has CPPD in knee, wrist cartilage. Continue sulfasalazine   CPPD: Continue colchicine.    Lisa Mckeon MD

## 2024-09-09 ENCOUNTER — APPOINTMENT (OUTPATIENT)
Dept: OBSTETRICS AND GYNECOLOGY | Facility: CLINIC | Age: 44
End: 2024-09-09
Payer: COMMERCIAL

## 2024-09-09 VITALS
HEIGHT: 68 IN | BODY MASS INDEX: 23.04 KG/M2 | DIASTOLIC BLOOD PRESSURE: 72 MMHG | SYSTOLIC BLOOD PRESSURE: 110 MMHG | WEIGHT: 152 LBS

## 2024-09-09 DIAGNOSIS — Z12.31 VISIT FOR SCREENING MAMMOGRAM: ICD-10-CM

## 2024-09-09 DIAGNOSIS — Z30.433 ENCOUNTER FOR IUD REMOVAL AND REINSERTION: ICD-10-CM

## 2024-09-09 PROCEDURE — 58300 INSERT INTRAUTERINE DEVICE: CPT | Performed by: OBSTETRICS & GYNECOLOGY

## 2024-09-09 PROCEDURE — 58301 REMOVE INTRAUTERINE DEVICE: CPT | Performed by: OBSTETRICS & GYNECOLOGY

## 2024-09-09 PROCEDURE — 87624 HPV HI-RISK TYP POOLED RSLT: CPT

## 2024-09-09 PROCEDURE — 3008F BODY MASS INDEX DOCD: CPT | Performed by: OBSTETRICS & GYNECOLOGY

## 2024-09-09 PROCEDURE — 88175 CYTOPATH C/V AUTO FLUID REDO: CPT

## 2024-09-09 PROCEDURE — 1036F TOBACCO NON-USER: CPT | Performed by: OBSTETRICS & GYNECOLOGY

## 2024-09-09 ASSESSMENT — ENCOUNTER SYMPTOMS
ENDOCRINE NEGATIVE: 0
ALLERGIC/IMMUNOLOGIC NEGATIVE: 0
HEMATOLOGIC/LYMPHATIC NEGATIVE: 0
PSYCHIATRIC NEGATIVE: 0
CARDIOVASCULAR NEGATIVE: 0
GASTROINTESTINAL NEGATIVE: 0
NEUROLOGICAL NEGATIVE: 0
RESPIRATORY NEGATIVE: 0
EYES NEGATIVE: 0
CONSTITUTIONAL NEGATIVE: 0
MUSCULOSKELETAL NEGATIVE: 0

## 2024-09-09 ASSESSMENT — PAIN SCALES - GENERAL: PAINLEVEL: 0-NO PAIN

## 2024-09-09 NOTE — PROGRESS NOTES
Subjective   Patient ID: Jess Paniagua is a 44 y.o. female who presents for IUD removal (Pt here for IUD Removal and Insertion) and IUD insertion.  No issues with IUDs in past.  Current IUD in since 2018.  Had periods with IUD monthly this past 4 years.  Previously, it was twice a year.  Last pap normal in 2018.    Desires menopause education wrt to hormone replacement. No symptoms currently.  Mother had menopause in 40s, and older sister is going through it now who is in her 50s.      Review of Systems  All pertinent positive symptoms are included in the history of present illness.  All other systems have been reviewed and are negative and noncontributory to this patient's current ailments.    Objective   Physical Exam  Exam conducted with a chaperone present.   Constitutional:       General: She is not in acute distress.     Appearance: Normal appearance. She is normal weight. She is not ill-appearing.   HENT:      Head: Normocephalic and atraumatic.   Genitourinary:     Exam position: Knee-chest position.      Labia:         Right: No rash or lesion.         Left: No rash or lesion.       Vagina: No signs of injury. No vaginal discharge, erythema, tenderness, bleeding or lesions.      Cervix: No discharge, lesion, erythema or cervical bleeding.   Skin:     General: Skin is warm and dry.   Neurological:      General: No focal deficit present.      Mental Status: She is alert. Mental status is at baseline.   Psychiatric:         Mood and Affect: Mood normal.         Behavior: Behavior normal.         Thought Content: Thought content normal.       Assessment/Plan   Diagnoses and all orders for this visit:  Visit for screening mammogram  -     BI mammo bilateral screening tomosynthesis; Future  Encounter for IUD removal and reinsertion  -     THINPREP PAP  -     levonorgestrel (Mirena) 21 mcg/24 hr (8 yrs) 52 mg IUD  -     IUD Insertion       Patient ID: Jess Paniagua is a 44 y.o. female.    IUD  Insertion    Performed by: Dipti Wan MD  Authorized by: Dipti Wan MD    Procedure: IUD removal and insertion    Consent obtained by patient, parent, or legal power of  - including discussion of procedure risks and benefits, patient questions answered, and patient education provided: yes    Reason for removal: patient request    Strings visualized: yes    Cervix cleaned with: chlorhexidine    Tenaculum applied to cervix: yes    IUD grasped by forceps: yes    IUD removed: yes    Date/Time of Removal:  9/9/2024 9:30 AM  Removed without complications: yes    IUD intact: yes    Pregnancy risk: reasonably certain the patient is not pregnant    Date/Time of Insertion:  9/9/2024 9:32 AM  Pelvic exam performed: yes    Speculum placed in vagina: yes    Cervix cleaned and prepped: yes    Tenaculum/Allis/Ring Forceps applied to cervix: yes    Anesthesia used: yes    Local anesthesia:  Paracervical  Local anesthetic:  Lidocaine  IUD inserted without complications: yes    OSM: levonorgestrel (Mirena) IUD 20 mcg/day  Patient tolerated procedure well: yes    Intended removal date: 8 years      Follow up for annual exam and to see how IUD is doing.    Agustin Donohue DO, PGY1   Family Medicine  09/09/24 9:41 AM

## 2024-09-16 LAB
CYTOLOGY CMNT CVX/VAG CYTO-IMP: NORMAL
HPV HR 12 DNA GENITAL QL NAA+PROBE: NEGATIVE
HPV HR GENOTYPES PNL CVX NAA+PROBE: NEGATIVE
HPV16 DNA SPEC QL NAA+PROBE: NEGATIVE
HPV18 DNA SPEC QL NAA+PROBE: NEGATIVE
LAB AP HPV GENOTYPE QUESTION: YES
LAB AP HPV HR: NORMAL
LABORATORY COMMENT REPORT: NORMAL
PATH REPORT.TOTAL CANCER: NORMAL

## 2024-09-24 ENCOUNTER — LAB (OUTPATIENT)
Dept: LAB | Facility: LAB | Age: 44
End: 2024-09-24
Payer: COMMERCIAL

## 2024-09-24 DIAGNOSIS — M46.90 AXIAL SPONDYLOARTHRITIS (CMS-HCC): ICD-10-CM

## 2024-09-24 PROCEDURE — 36415 COLL VENOUS BLD VENIPUNCTURE: CPT

## 2024-09-24 PROCEDURE — 85025 COMPLETE CBC W/AUTO DIFF WBC: CPT

## 2024-09-24 PROCEDURE — 80053 COMPREHEN METABOLIC PANEL: CPT

## 2024-09-25 LAB
ALBUMIN SERPL BCP-MCNC: 4.6 G/DL (ref 3.4–5)
ALP SERPL-CCNC: 56 U/L (ref 33–110)
ALT SERPL W P-5'-P-CCNC: 23 U/L (ref 7–45)
ANION GAP SERPL CALC-SCNC: 10 MMOL/L (ref 10–20)
AST SERPL W P-5'-P-CCNC: 21 U/L (ref 9–39)
BASOPHILS # BLD AUTO: 0.07 X10*3/UL (ref 0–0.1)
BASOPHILS NFR BLD AUTO: 0.9 %
BILIRUB SERPL-MCNC: 0.6 MG/DL (ref 0–1.2)
BUN SERPL-MCNC: 15 MG/DL (ref 6–23)
CALCIUM SERPL-MCNC: 9.5 MG/DL (ref 8.6–10.6)
CHLORIDE SERPL-SCNC: 102 MMOL/L (ref 98–107)
CO2 SERPL-SCNC: 28 MMOL/L (ref 21–32)
CREAT SERPL-MCNC: 0.64 MG/DL (ref 0.5–1.05)
EGFRCR SERPLBLD CKD-EPI 2021: >90 ML/MIN/1.73M*2
EOSINOPHIL # BLD AUTO: 0.32 X10*3/UL (ref 0–0.7)
EOSINOPHIL NFR BLD AUTO: 4.2 %
ERYTHROCYTE [DISTWIDTH] IN BLOOD BY AUTOMATED COUNT: 12.5 % (ref 11.5–14.5)
GLUCOSE SERPL-MCNC: 66 MG/DL (ref 74–99)
HCT VFR BLD AUTO: 41.8 % (ref 36–46)
HGB BLD-MCNC: 13.9 G/DL (ref 12–16)
IMM GRANULOCYTES # BLD AUTO: 0.02 X10*3/UL (ref 0–0.7)
IMM GRANULOCYTES NFR BLD AUTO: 0.3 % (ref 0–0.9)
LYMPHOCYTES # BLD AUTO: 2.42 X10*3/UL (ref 1.2–4.8)
LYMPHOCYTES NFR BLD AUTO: 31.7 %
MCH RBC QN AUTO: 31.4 PG (ref 26–34)
MCHC RBC AUTO-ENTMCNC: 33.3 G/DL (ref 32–36)
MCV RBC AUTO: 94 FL (ref 80–100)
MONOCYTES # BLD AUTO: 0.57 X10*3/UL (ref 0.1–1)
MONOCYTES NFR BLD AUTO: 7.5 %
NEUTROPHILS # BLD AUTO: 4.23 X10*3/UL (ref 1.2–7.7)
NEUTROPHILS NFR BLD AUTO: 55.4 %
NRBC BLD-RTO: 0 /100 WBCS (ref 0–0)
PLATELET # BLD AUTO: 264 X10*3/UL (ref 150–450)
POTASSIUM SERPL-SCNC: 4.4 MMOL/L (ref 3.5–5.3)
PROT SERPL-MCNC: 7 G/DL (ref 6.4–8.2)
RBC # BLD AUTO: 4.43 X10*6/UL (ref 4–5.2)
SODIUM SERPL-SCNC: 136 MMOL/L (ref 136–145)
WBC # BLD AUTO: 7.6 X10*3/UL (ref 4.4–11.3)

## 2024-10-08 DIAGNOSIS — M46.90 AXIAL SPONDYLOARTHRITIS (CMS-HCC): ICD-10-CM

## 2024-10-08 RX ORDER — MELOXICAM 7.5 MG/1
TABLET ORAL
Qty: 180 TABLET | Refills: 0 | Status: SHIPPED | OUTPATIENT
Start: 2024-10-08

## 2024-10-08 RX ORDER — MELOXICAM 7.5 MG/1
7.5 TABLET ORAL 2 TIMES DAILY PRN
Qty: 180 TABLET | Refills: 0 | OUTPATIENT
Start: 2024-10-08

## 2024-12-09 ENCOUNTER — APPOINTMENT (OUTPATIENT)
Dept: OBSTETRICS AND GYNECOLOGY | Facility: CLINIC | Age: 44
End: 2024-12-09
Payer: COMMERCIAL

## 2024-12-16 DIAGNOSIS — M46.90 AXIAL SPONDYLOARTHRITIS (CMS-HCC): ICD-10-CM

## 2024-12-16 NOTE — PROGRESS NOTES
Refill request received from patient for prescribed sulfasalazine. Request pended to provider to authorization.

## 2024-12-17 RX ORDER — SULFASALAZINE 500 MG/1
1000 TABLET, DELAYED RELEASE ORAL 2 TIMES DAILY
Qty: 240 TABLET | Refills: 1 | Status: SHIPPED | OUTPATIENT
Start: 2024-12-17

## 2025-01-13 ENCOUNTER — APPOINTMENT (OUTPATIENT)
Dept: RHEUMATOLOGY | Facility: CLINIC | Age: 45
End: 2025-01-13
Payer: COMMERCIAL

## 2025-01-14 DIAGNOSIS — M46.90 AXIAL SPONDYLOARTHRITIS (CMS-HCC): ICD-10-CM

## 2025-01-14 RX ORDER — MELOXICAM 7.5 MG/1
TABLET ORAL
Qty: 180 TABLET | Refills: 0 | Status: SHIPPED | OUTPATIENT
Start: 2025-01-14

## 2025-01-20 ENCOUNTER — APPOINTMENT (OUTPATIENT)
Dept: OBSTETRICS AND GYNECOLOGY | Facility: CLINIC | Age: 45
End: 2025-01-20
Payer: COMMERCIAL

## 2025-01-20 VITALS
HEIGHT: 68 IN | BODY MASS INDEX: 24.25 KG/M2 | DIASTOLIC BLOOD PRESSURE: 78 MMHG | WEIGHT: 160 LBS | SYSTOLIC BLOOD PRESSURE: 110 MMHG

## 2025-01-20 DIAGNOSIS — Z12.31 VISIT FOR SCREENING MAMMOGRAM: ICD-10-CM

## 2025-01-20 DIAGNOSIS — Z12.11 SCREENING FOR COLON CANCER: Primary | ICD-10-CM

## 2025-01-20 DIAGNOSIS — Z30.431 IUD CHECK UP: ICD-10-CM

## 2025-01-20 PROCEDURE — 99213 OFFICE O/P EST LOW 20 MIN: CPT | Performed by: OBSTETRICS & GYNECOLOGY

## 2025-01-20 PROCEDURE — 1036F TOBACCO NON-USER: CPT | Performed by: OBSTETRICS & GYNECOLOGY

## 2025-01-20 PROCEDURE — 3008F BODY MASS INDEX DOCD: CPT | Performed by: OBSTETRICS & GYNECOLOGY

## 2025-01-20 ASSESSMENT — ENCOUNTER SYMPTOMS
HEMATOLOGIC/LYMPHATIC NEGATIVE: 0
NEUROLOGICAL NEGATIVE: 0
CONSTITUTIONAL NEGATIVE: 0
CARDIOVASCULAR NEGATIVE: 0
ALLERGIC/IMMUNOLOGIC NEGATIVE: 0
ENDOCRINE NEGATIVE: 0
MUSCULOSKELETAL NEGATIVE: 0
PSYCHIATRIC NEGATIVE: 0
GASTROINTESTINAL NEGATIVE: 0
EYES NEGATIVE: 0
RESPIRATORY NEGATIVE: 0

## 2025-01-20 ASSESSMENT — PAIN SCALES - GENERAL: PAINLEVEL_OUTOF10: 0-NO PAIN

## 2025-01-20 NOTE — PROGRESS NOTES
Subjective   Patient ID: Jess Paniagua is a 44 y.o. female who presents for IUD check.    44 year old female presenting for annual exam. Overall doing, offers no complaints. IUD placed on 09/2024, no issues.     Mammogram scheduled for later this month. Last 01/2024- BIRADS negative   Last pap smear 09/2024- neg/neg  Will place order for colonoscopy today         Review of Systems   All other systems reviewed and are negative.      Objective   Physical Exam  Vitals and nursing note reviewed.   Constitutional:       Appearance: Normal appearance.   HENT:      Head: Normocephalic.      Nose: Nose normal.      Mouth/Throat:      Mouth: Mucous membranes are moist.      Pharynx: Oropharynx is clear.   Eyes:      Conjunctiva/sclera: Conjunctivae normal.      Pupils: Pupils are equal, round, and reactive to light.   Cardiovascular:      Rate and Rhythm: Normal rate and regular rhythm.      Pulses: Normal pulses.   Pulmonary:      Effort: Pulmonary effort is normal.      Breath sounds: Normal breath sounds.   Abdominal:      General: Bowel sounds are normal.      Palpations: Abdomen is soft.   Genitourinary:     General: Normal vulva.      Exam position: Lithotomy position.      Vagina: Normal.      Cervix: Normal.      Uterus: Normal.       Adnexa: Right adnexa normal and left adnexa normal.      Rectum: Normal.      Comments: IUD strings visualized   Musculoskeletal:         General: Normal range of motion.      Cervical back: Normal range of motion and neck supple.   Skin:     General: Skin is warm and dry.      Capillary Refill: Capillary refill takes less than 2 seconds.   Neurological:      General: No focal deficit present.      Mental Status: She is alert and oriented to person, place, and time. Mental status is at baseline.   Psychiatric:         Mood and Affect: Mood normal.         Assessment/Plan   Problem List Items Addressed This Visit    None  Visit Diagnoses         Codes    Screening for colon cancer    -   Primary Z12.11    Relevant Orders    Colonoscopy Screening; Average Risk Patient    IUD check up     Z30.431    Visit for screening mammogram     Z12.31    Relevant Orders    BI mammo bilateral screening tomosynthesis                 Shea Melgoza, JEF-CNP 01/20/25 10:47 AM

## 2025-01-21 ENCOUNTER — HOSPITAL ENCOUNTER (OUTPATIENT)
Dept: RADIOLOGY | Facility: CLINIC | Age: 45
Discharge: HOME | End: 2025-01-21
Payer: COMMERCIAL

## 2025-01-21 DIAGNOSIS — E04.1 SOLITARY THYROID NODULE: ICD-10-CM

## 2025-01-21 PROCEDURE — 76536 US EXAM OF HEAD AND NECK: CPT | Performed by: RADIOLOGY

## 2025-01-21 PROCEDURE — 76536 US EXAM OF HEAD AND NECK: CPT

## 2025-01-27 ENCOUNTER — APPOINTMENT (OUTPATIENT)
Dept: RHEUMATOLOGY | Facility: CLINIC | Age: 45
End: 2025-01-27
Payer: COMMERCIAL

## 2025-01-27 ENCOUNTER — HOSPITAL ENCOUNTER (OUTPATIENT)
Dept: RADIOLOGY | Facility: CLINIC | Age: 45
Discharge: HOME | End: 2025-01-27
Payer: COMMERCIAL

## 2025-01-27 VITALS
SYSTOLIC BLOOD PRESSURE: 113 MMHG | BODY MASS INDEX: 23.34 KG/M2 | HEART RATE: 66 BPM | TEMPERATURE: 97.2 F | WEIGHT: 154 LBS | HEIGHT: 68 IN | DIASTOLIC BLOOD PRESSURE: 72 MMHG

## 2025-01-27 DIAGNOSIS — M46.90 AXIAL SPONDYLOARTHRITIS (CMS-HCC): ICD-10-CM

## 2025-01-27 DIAGNOSIS — Z12.31 VISIT FOR SCREENING MAMMOGRAM: ICD-10-CM

## 2025-01-27 DIAGNOSIS — M11.20 CALCIUM PYROPHOSPHATE DEPOSITION DISEASE: ICD-10-CM

## 2025-01-27 PROCEDURE — 3008F BODY MASS INDEX DOCD: CPT | Performed by: INTERNAL MEDICINE

## 2025-01-27 PROCEDURE — 77067 SCR MAMMO BI INCL CAD: CPT

## 2025-01-27 PROCEDURE — 77063 BREAST TOMOSYNTHESIS BI: CPT | Performed by: STUDENT IN AN ORGANIZED HEALTH CARE EDUCATION/TRAINING PROGRAM

## 2025-01-27 PROCEDURE — 1036F TOBACCO NON-USER: CPT | Performed by: INTERNAL MEDICINE

## 2025-01-27 PROCEDURE — 77067 SCR MAMMO BI INCL CAD: CPT | Performed by: STUDENT IN AN ORGANIZED HEALTH CARE EDUCATION/TRAINING PROGRAM

## 2025-01-27 PROCEDURE — 99214 OFFICE O/P EST MOD 30 MIN: CPT | Performed by: INTERNAL MEDICINE

## 2025-01-27 RX ORDER — MELOXICAM 7.5 MG/1
TABLET ORAL
Qty: 180 TABLET | Refills: 0 | Status: SHIPPED | OUTPATIENT
Start: 2025-01-27

## 2025-01-27 RX ORDER — SULFASALAZINE 500 MG/1
1000 TABLET, DELAYED RELEASE ORAL 2 TIMES DAILY
Qty: 240 TABLET | Refills: 1 | Status: SHIPPED | OUTPATIENT
Start: 2025-01-27

## 2025-01-27 RX ORDER — COLCHICINE 0.6 MG/1
0.6 TABLET ORAL DAILY
Qty: 90 TABLET | Refills: 1 | Status: SHIPPED | OUTPATIENT
Start: 2025-01-27

## 2025-01-27 ASSESSMENT — PAIN SCALES - GENERAL: PAINLEVEL_OUTOF10: 0-NO PAIN

## 2025-01-27 NOTE — PROGRESS NOTES
Recall     39 yo F who dates the history back to her 20's when she started with knee effusion and SI joint pain.   She was initially diagnosed with plantar fasciitis and received cortisone injection.  She also noticed one flare involving her knees associated with significant swelling. Was getting intermittent flares and took Ibuprofen which helped the symptoms.   She saw Dr. Rodriguez in 2020 as symptoms got worse. She was having worsening pain in her heels, and lateral hip pain and other joints.   Had workup which revealed positive HLA-B27. 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   Her symptoms would be worse in the morning with stiffness. Prolonged sitting made the pain worse.   Was told has CPPD disease and was started on colchicine and Naproxen. Lateral switched to meloxicam.      NO family history of SpA.   Has no H/O psoriasis or uveitis or IBD.   Had Xray of the SI joints in 2019 which were normal. MRI of the SI joint is no edema  Started on sulfasalazine 500 mg BID 3/24. Colchicine reduced to every other day.        Chief Complaint: Follow up of CPPD and axial SpA           HPI: At previous visit visit, reported no morning stiffness. The heel pain is better. Hpwever, taking Meloxicam BID. She has intermittent joint pain. No joint swelling. No back pain.    No flare of pseudogout.   No diarrhea.         Patient Active Problem List   Diagnosis    Abnormal finding on examination of thyroid gland    Melanocytic nevi of lower limb, including hip    IUD (intrauterine device) in place    Liver nodule    Melanocytic nevi of left upper limb, including shoulder    HLA B27 (HLA B27 positive)    Hemangioma of skin and subcutaneous tissue    Carcinoma in situ of skin of other parts of face    Calcium pyrophosphate deposition disease    Basal cell carcinoma of skin of right upper limb, including shoulder    Basal cell carcinoma of skin of  other parts of face    Arthralgia of multiple sites    Melanocytic nevi of trunk    Melanocytic nevi of other parts of face    Neoplasm of uncertain behavior of skin    Neoplasm of unspecified behavior of bone, soft tissue, and skin    Other melanin hyperpigmentation    Other seborrheic keratosis    Personal history of other malignant neoplasm of skin    Scar condition and fibrosis of skin    Solitary thyroid nodule    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    Status post partial thyroidectomy    History of anemia    History of gross hematuria    NSAID long-term use    Plantar fasciitis    Congenital valgus deformity of foot         Past Medical History:   Diagnosis Date    Encounter for gynecological examination (general) (routine) without abnormal findings     Well woman exam with routine gynecological exam    Pain in right foot 09/21/2017    Right foot pain    Personal history of diseases of the blood and blood-forming organs and certain disorders involving the immune mechanism 06/01/2022    History of anemia    Personal history of other diseases of urinary system 12/10/2018    History of hematuria    Personal history of other endocrine, nutritional and metabolic disease 12/06/2018    History of thyroid disorder    Personal history of other specified conditions 02/28/2019    History of left flank pain    Right lower quadrant pain 02/28/2019    Acute right lower quadrant pain    Unspecified complication of genitourinary prosthetic device, implant and graft, initial encounter (CMS-[facility]) 12/07/2018    IUD complication            Past Surgical History:   Procedure Laterality Date    OTHER SURGICAL HISTORY  12/06/2018    Subtotal thyroidectomy       Allergies   Allergen Reactions    Prochlorperazine Other and Unknown     dystonia       Medication Documentation Review Audit       Reviewed by Rosemary Nieves RN (Registered Nurse) on 01/27/25 at 1000      Medication Order Taking? Sig Documenting Provider Last Dose  Status   cholecalciferol (Vitamin D-3) 50 mcg (2,000 unit) capsule [id number] Yes Take 1 capsule (50 mcg) by mouth once daily. Historical Provider, MD Taking Active   colchicine 0.6 mg tablet [id number] Yes Take 1 tablet (0.6 mg) by mouth once daily. As directed. Lisa Mckeon MD  Active   levonorgestrel (Mirena) 21 mcg/24 hours (8 yrs) 52 mg IUD [id number] Yes Inserted 5/14/2018 by Dr. Savage BUSCH Historical Provider, MD Taking Active   meloxicam (Mobic) 7.5 mg tablet [id number] Yes TAKE ONE TABLET (7.5 MG) BY MOUTH TWICE DAILY AS NEEDED Lisa Mckeon MD  Active   sulfaSALAzine (Azulfidine EN-tabs) 500 mg DR tablet [id number] Yes Take 2 tablets (1,000 mg) by mouth 2 times a day. Do not crush, chew, or split. Lisa Mckeon MD  Active   vitamin B complex (B-COMPLEX ORAL) [id number] Yes Take 1 capsule by mouth once daily. Historical Provider, MD Taking Active                        Family History   Problem Relation Name Age of Onset    Alcohol abuse Mother      Atrial fibrillation Mother      Other (Osteopoikilosis) Mother      Osteoarthritis Mother      Other (Accident) Father      Sarcoidosis Father      Bipolar disorder Maternal Grandmother          Depression    Cancer Maternal Grandmother          Gallbladder    Other (Muscle disease) Maternal Grandmother      Bipolar disorder Maternal Grandfather          Depression    Cancer Maternal Grandfather          Gallbladder    Diabetes type II Maternal Grandfather      Other (Muscle disease) Maternal Grandfather      Osteoarthritis Maternal Grandfather      Cancer Paternal Grandmother          Gallbladder    Bipolar disorder Paternal Grandfather          Depression    Cancer Paternal Grandfather          Gallbladder          Social History     Tobacco Use    Smoking status: Never     Passive exposure: Past    Smokeless tobacco: Never   Substance Use Topics    Alcohol use: Yes     Alcohol/week: 3.0 standard drinks of alcohol     Types: 3 Glasses of wine per week      "Comment: occ    Drug use: Never         Review of Systems  Constitutional: No fatigue  Skin:  No rash or psoriasis or photosensitvity  Head: No headache, oral ulcers or hair loss  Neck: No difficulty swallowing or choking  Eyes: No dry eyes or iritis  Mouth: No dry mouth  or oral ulcers  Pulmonary: No wheezing, pleurisy or SOB  Cardiovascular: No chest pain or palpitations  Gastrointestinal: No abdominal pain, nausea, heartburn, or blood in stool  Endocrine: No Raynaud's   Musculoskeletal: As H/P    Current Outpatient Medications   Medication Sig Dispense Refill    cholecalciferol (Vitamin D-3) 50 mcg (2,000 unit) capsule Take 1 capsule (50 mcg) by mouth once daily.      colchicine 0.6 mg tablet Take 1 tablet (0.6 mg) by mouth once daily. As directed. 90 tablet 1    levonorgestrel (Mirena) 21 mcg/24 hours (8 yrs) 52 mg IUD Inserted 5/14/2018 by Dr. Savage BUSCH      meloxicam (Mobic) 7.5 mg tablet TAKE ONE TABLET (7.5 MG) BY MOUTH TWICE DAILY AS NEEDED 180 tablet 0    sulfaSALAzine (Azulfidine EN-tabs) 500 mg DR tablet Take 2 tablets (1,000 mg) by mouth 2 times a day. Do not crush, chew, or split. 240 tablet 1    vitamin B complex (B-COMPLEX ORAL) Take 1 capsule by mouth once daily.       No current facility-administered medications for this visit.           PHYSICAL EXAM:  Blood pressure 113/72, pulse 66, temperature 36.2 °C (97.2 °F), height 1.727 m (5' 8\"), weight 69.9 kg (154 lb).  General - NAD, sitting up in chair, well-groomed, pleasant, AAOx3  Head: Normocephalic, atraumatic  Eyes - PERRLA, EOMI. No conjunctiva injection.   Mouth/ENT - Moist oral and nasal mucosa. No facial rash. No enlarged parotid or submandibular gland. Adequate salivary pooling.  Cardiovascular - Normal S1, S2. Regular rate and rhythm. No murmurs or rubs.  Lungs - Symmetric chest expansion. Clear to auscultation bilaterally.   Skin - No rashes or ulcers. Skin warm and dry. No erythema on bilateral cheeks.  Abdomen - Soft, non-tender. No " masses. Normal bowel sounds.  Extremities - No edema, cyanosis ,or clubbing  Neurological - Alert and oriented x 3,  grossly intact. No focal deficit.  Musculoskeletal -  EXAMJOINTDETAILED,  Shoulders: Full ROM, without pain, no swelling, warmth or tenderness.  Elbows: Full ROM, without pain, no swelling, warmth or tenderness.  Wrists: Full ROM, without pain, no swelling, warmth or tenderness.  MCP: No swelling, warmth or tenderness. Metacarpal squeeze negative  PIP: No swelling, warmth or tenderness.  DIP: No swelling, warmth or tenderness.  Hands : 5/5.    Feet: No tenderness      Component      Latest Ref Rng 9/24/2024   WBC      4.4 - 11.3 x10*3/uL 7.6    nRBC      0.0 - 0.0 /100 WBCs 0.0    RBC      4.00 - 5.20 x10*6/uL 4.43    HEMOGLOBIN      12.0 - 16.0 g/dL 13.9    HEMATOCRIT      36.0 - 46.0 % 41.8    MCV      80 - 100 fL 94    MCH      26.0 - 34.0 pg 31.4    MCHC      32.0 - 36.0 g/dL 33.3    RED CELL DISTRIBUTION WIDTH      11.5 - 14.5 % 12.5    Platelets      150 - 450 x10*3/uL 264    Neutrophils %      40.0 - 80.0 % 55.4    Immature Granulocytes %, Automated      0.0 - 0.9 % 0.3    Lymphocytes %      13.0 - 44.0 % 31.7    Monocytes %      2.0 - 10.0 % 7.5    Eosinophils %      0.0 - 6.0 % 4.2    Basophils %      0.0 - 2.0 % 0.9    Neutrophils Absolute      1.20 - 7.70 x10*3/uL 4.23    Immature Granulocytes Absolute, Automated      0.00 - 0.70 x10*3/uL 0.02    Lymphocytes Absolute      1.20 - 4.80 x10*3/uL 2.42    Monocytes Absolute      0.10 - 1.00 x10*3/uL 0.57    Eosinophils Absolute      0.00 - 0.70 x10*3/uL 0.32    Basophils Absolute      0.00 - 0.10 x10*3/uL 0.07    GLUCOSE      74 - 99 mg/dL 66 (L)    SODIUM      136 - 145 mmol/L 136    POTASSIUM      3.5 - 5.3 mmol/L 4.4    CHLORIDE      98 - 107 mmol/L 102    Bicarbonate      21 - 32 mmol/L 28    Anion Gap      10 - 20 mmol/L 10    Blood Urea Nitrogen      6 - 23 mg/dL 15    Creatinine      0.50 - 1.05 mg/dL 0.64    EGFR      >60  mL/min/1.73m*2 >90    Calcium      8.6 - 10.6 mg/dL 9.5    Albumin      3.4 - 5.0 g/dL 4.6    Alkaline Phosphatase      33 - 110 U/L 56    Total Protein      6.4 - 8.2 g/dL 7.0    AST      9 - 39 U/L 21    Bilirubin Total      0.0 - 1.2 mg/dL 0.6    ALT      7 - 45 U/L 23         44 yr old with history of enthesitis , and plantar fascitis. She is HLA-B27 positive and also has CPPD in knee, wrist cartilage. Continue sulfasalazine   CPPD: Continue colchicine.    Lisa Mckeon MD

## 2025-02-03 ENCOUNTER — APPOINTMENT (OUTPATIENT)
Dept: DERMATOLOGY | Facility: CLINIC | Age: 45
End: 2025-02-03
Payer: COMMERCIAL

## 2025-02-03 DIAGNOSIS — Z85.828 PERSONAL HISTORY OF SKIN CANCER: ICD-10-CM

## 2025-02-03 DIAGNOSIS — L98.9 DERMATOLOGIC PROBLEM: ICD-10-CM

## 2025-02-03 DIAGNOSIS — L81.4 LENTIGO: Primary | ICD-10-CM

## 2025-02-03 DIAGNOSIS — L82.0 INFLAMED SEBORRHEIC KERATOSIS: ICD-10-CM

## 2025-02-03 PROCEDURE — 17110 DESTRUCTION B9 LES UP TO 14: CPT | Performed by: DERMATOLOGY

## 2025-02-03 PROCEDURE — 99213 OFFICE O/P EST LOW 20 MIN: CPT | Performed by: DERMATOLOGY

## 2025-02-03 PROCEDURE — 1036F TOBACCO NON-USER: CPT | Performed by: DERMATOLOGY

## 2025-02-03 NOTE — PROGRESS NOTES
Subjective     Jess Paniagua is a 44 y.o. female who presents for the following: Suspicious Skin Lesion (Left chest lesion, left hip lesion, right cheek lesion- Personal history of basal cell carcinoma. Personal history of squamous cell carcinoma. Chaperone offered and declined.  ).     Review of Systems:  No other skin or systemic complaints other than what is documented elsewhere in the note.    The following portions of the chart were reviewed this encounter and updated as appropriate:   Tobacco  Allergies  Meds  Problems  Med Hx  Surg Hx  Fam Hx                Objective   Well appearing patient in no apparent distress; mood and affect are within normal limits.    A focused skin examination was performed. All findings within normal limits unless otherwise noted below.    Assessment/Plan   1. Lentigo  Right Malar Cheek  Tellez macules    (lesions of patient's concern)  -Benign appearing on exam  -Reassurance, recommend observation    2. Inflamed seborrheic keratosis (2)  Left Breast, Left Hip  Stuck-on, waxy macule(s)/papule(s)/plaque(s) with comedo-like openings and milia-like cysts with surrounding erythema and crusting    -Patient requests cryotherapy today for these clinically inflamed lesions  -Possible side effects of liquid nitrogen treatment reviewed including formation of blisters, crusting, tenderness, scar, and discoloration which may be permanent.    Patient to notify me in 4 weeks if not resolved and will biopsy    Destr of lesion - Left Breast, Left Hip  Complexity: simple    Destruction method: cryotherapy    Informed consent: discussed and consent obtained    Lesion destroyed using liquid nitrogen: Yes    Outcome: patient tolerated procedure well with no complications      3. Personal history of skin cancer    Personal History of Non-Melanoma Skin Cancer, multiple, most recent 4/2023 BCC R upper arm  -Well healed scar(s) with no evidence of recurrence  -Discussed the need for annual or  semi-annual skin examinations and to return sooner if any new or changing lesions are noticed. Patient verbalizes understanding    4. Dermatologic problem    Related Procedures  Follow Up In Dermatology - Established Patient        Follow up as needed. Patient declines FSE today.  Discussed if there are any changes or development of concerning symptoms (lesion/skin condition is changing, bleeding, enlarging, or worsening) the patient is to contact my office. The patient verbalizes understanding.    Arabella Ewing MD  2/3/2025

## 2025-03-10 ENCOUNTER — APPOINTMENT (OUTPATIENT)
Dept: SURGERY | Facility: CLINIC | Age: 45
End: 2025-03-10
Payer: COMMERCIAL

## 2025-03-18 ENCOUNTER — APPOINTMENT (OUTPATIENT)
Dept: GASTROENTEROLOGY | Facility: HOSPITAL | Age: 45
End: 2025-03-18
Payer: COMMERCIAL

## 2025-03-18 ENCOUNTER — OFFICE VISIT (OUTPATIENT)
Dept: GASTROENTEROLOGY | Facility: EXTERNAL LOCATION | Age: 45
End: 2025-03-18
Payer: COMMERCIAL

## 2025-03-18 DIAGNOSIS — K64.9 HEMORRHOIDS, UNSPECIFIED HEMORRHOID TYPE: ICD-10-CM

## 2025-03-18 DIAGNOSIS — Z12.11 SCREENING FOR COLON CANCER: Primary | ICD-10-CM

## 2025-03-18 DIAGNOSIS — Z12.11 SCREENING FOR COLON CANCER: ICD-10-CM

## 2025-03-18 PROCEDURE — G0121 COLON CA SCRN NOT HI RSK IND: HCPCS | Performed by: STUDENT IN AN ORGANIZED HEALTH CARE EDUCATION/TRAINING PROGRAM

## 2025-03-24 ENCOUNTER — APPOINTMENT (OUTPATIENT)
Dept: GASTROENTEROLOGY | Facility: EXTERNAL LOCATION | Age: 45
End: 2025-03-24
Payer: COMMERCIAL

## 2025-03-31 ENCOUNTER — APPOINTMENT (OUTPATIENT)
Dept: GASTROENTEROLOGY | Facility: EXTERNAL LOCATION | Age: 45
End: 2025-03-31
Payer: COMMERCIAL

## 2025-03-31 ENCOUNTER — APPOINTMENT (OUTPATIENT)
Dept: DERMATOLOGY | Facility: CLINIC | Age: 45
End: 2025-03-31
Payer: COMMERCIAL

## 2025-03-31 DIAGNOSIS — Z85.828 PERSONAL HISTORY OF SKIN CANCER: ICD-10-CM

## 2025-03-31 DIAGNOSIS — L81.4 LENTIGO: ICD-10-CM

## 2025-03-31 DIAGNOSIS — Z12.83 SCREENING EXAM FOR SKIN CANCER: ICD-10-CM

## 2025-03-31 DIAGNOSIS — D18.01 HEMANGIOMA OF SKIN: ICD-10-CM

## 2025-03-31 DIAGNOSIS — D22.9 MULTIPLE BENIGN NEVI: Primary | ICD-10-CM

## 2025-03-31 DIAGNOSIS — L82.1 SEBORRHEIC KERATOSIS: ICD-10-CM

## 2025-03-31 PROCEDURE — 1036F TOBACCO NON-USER: CPT | Performed by: DERMATOLOGY

## 2025-03-31 PROCEDURE — 99213 OFFICE O/P EST LOW 20 MIN: CPT | Performed by: DERMATOLOGY

## 2025-03-31 NOTE — PROGRESS NOTES
Subjective     Jess Paniagua is a 44 y.o. female who presents for the following: Skin Check (Personal history of basal cell carcinoma. Personal history of squamous cell carcinoma. Chaperone offered and declined. ).     Review of Systems:  No other skin or systemic complaints other than what is documented elsewhere in the note.    The following portions of the chart were reviewed this encounter and updated as appropriate:  Tobacco  Allergies  Meds  Problems  Med Hx  Surg Hx  Fam Hx                Objective     Well appearing patient in no apparent distress; mood and affect are within normal limits.    A full examination was performed including scalp, face, neck, chest, abdomen, back, bilateral upper extremities, bilateral lower extremities. Patient declines exam underneath the underwear; patient declines exam of the lower abdomen/mons pubis, buttocks, groin, genitalia, perineal and perianal skin and these areas were not examined. The patient declines removal of the bra today and declines examination of the areas beneath the bra and these areas (portions of the breasts/chest/back) were not examined. The patient declines removal of the socks and the feet and lower legs were not examined.     All findings within normal limits unless otherwise noted below.    Assessment/Plan   1. Multiple benign nevi  Brown and tan macules and papules with reassuring findings on dermoscopy    -These lesions have benign, reassuring patterns on dermoscopy  -Recommend continued self observation, and to contact the office if any changes in nevi are noticed    2. Lentigo  Tan macules    -Benign appearing on exam  -Reassurance, recommend observation    3. Seborrheic keratosis  Stuck on, waxy macule(s)/papule(s)/plaque(s) with comedo-like openings and milia like cysts    -Discussed the nature of the diagnosis  -Reassurance, recommend continued observation    4. Hemangioma of skin  Cherry red papules    -Discussed the nature of the  diagnosis  -Reassurance, recommend continued observation    5. Personal history of skin cancer    Personal History of Non-Melanoma Skin Cancer, multiple, most recent 4/2023 BCC R upper arm  -Well healed scar(s) with no evidence of recurrence  -Discussed the need for annual or semi-annual skin examinations and to return sooner if any new or changing lesions are noticed. Patient verbalizes understanding    6. Screening exam for skin cancer        Discussed/information given on safe sun practices and use of sunscreen, sun protective clothing or sun avoidance. Recommend to use over the counter medication of sunscreen with a SPF 30 or higher on a daily basis prior to sun exposure to reduce the risk of skin cancer.    Follow up in 1 year for FSE  Discussed if there are any changes or development of concerning symptoms (lesion/skin condition is changing, bleeding, enlarging, or worsening) the patient is to contact my office. The patient verbalizes understanding.     Arabella Ewing MD  3/31/2025

## 2025-04-09 ENCOUNTER — APPOINTMENT (OUTPATIENT)
Dept: SURGERY | Facility: CLINIC | Age: 45
End: 2025-04-09
Payer: COMMERCIAL

## 2025-04-09 VITALS
BODY MASS INDEX: 24.25 KG/M2 | RESPIRATION RATE: 16 BRPM | SYSTOLIC BLOOD PRESSURE: 121 MMHG | HEIGHT: 68 IN | DIASTOLIC BLOOD PRESSURE: 81 MMHG | WEIGHT: 160 LBS | HEART RATE: 68 BPM

## 2025-04-09 DIAGNOSIS — E04.1 SOLITARY THYROID NODULE: ICD-10-CM

## 2025-04-09 PROCEDURE — 3008F BODY MASS INDEX DOCD: CPT | Performed by: SURGERY

## 2025-04-09 PROCEDURE — 99214 OFFICE O/P EST MOD 30 MIN: CPT | Performed by: SURGERY

## 2025-04-09 ASSESSMENT — PAIN SCALES - GENERAL: PAINLEVEL_OUTOF10: 0-NO PAIN

## 2025-04-09 NOTE — PROGRESS NOTES
Subjective   Patient ID: Jess Paniagua is a 44 y.o. female who presents for follow-up of nodular thyroid disease.    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  I have been following her since 2022 for nodular thyroid disease.  She had a known history of a left thyroid lobectomy in 2003 at the [facility] for a benign nodule.    She never required thyroid hormone replacement.  She remains clinically euthyroid.  It looks like it has been about 2 years since she has had her TSH levels checked.  Therefore I will send her to the lab at the end of our visit today to check TSH and T4 levels for her.  I did also recommend to her that going forward she should get these levels checked at least once a year with her PCP.    She denies any changes in her neck.  No trouble swallowing or breathing.  No pain or pressure.    Prior to her appointment today we did do a follow-up thyroid ultrasound.  Left thyroid lobe is surgically absent.  Right lobe has a stable 1.5 cm nodule.  This is the same size it was last year.  At her original biopsy it was 1.7 cm in size.  Original biopsy was benign.  No additional biopsies are required.    Review of Systems    Objective   Physical Exam  Vitals reviewed.   Constitutional:       Appearance: Normal appearance.   Neck:      Comments: Neck incision is well-healed.  Left lobe surgically absent.  Palpable 1 cm nodule midportion right thyroid lobe which is felt best upon swallowing.  Trachea midline.  Lymphadenopathy:      Cervical: No cervical adenopathy.   Neurological:      Mental Status: She is alert.     US THYROID;  1/21/2025 1:43 pm      INDICATION:  Signs/Symptoms:thyroid nodule surveillance.      ,E04.1 Nontoxic single thyroid nodule      COMPARISON:  12/13/2022      ACCESSION NUMBER(S):  [id number]      ORDERING CLINICIAN:  GABE FELICIANO      TECHNIQUE:  Multiple ultrasonographic images of the thyroid gland were obtained.      FINDINGS:          RIGHT LOBE:  The  right lobe of the thyroid measures 2.9 cm x 5.7 x 1.9 cm.. The  right lobe of the thyroid is homogeneous in echotexture and  demonstrates a hypoechoic solid nodule with likely associated  calcifications. This is likely TR 5 and measures approximately 15 x 7  x 15 mm. Previously a cystic nodule was seen within the right lobe.      LEFT LOBE:  The left lobe of the thyroid is surgically absent.      ISTHMUS:  The isthmus measures approximately .3cm and is homogeneous in  echotexture and without any identifiable nodules.      CERVICAL LYMPH NODES:  No significant adenopathy.      IMPRESSION:  Status post left thyroidectomy.      Hypoechoic apparently solid TR 5 right lobe thyroid nodule.    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  She status post left thyroid lobectomy for benign nodule in 2003 at an outside institution.  Her right lobe has a 1.5 cm nodule which remained stable.  This was biopsy-proven benign in 2022.    She has no new compressive symptoms.  She remains clinically euthyroid.  She is due for updated thyroid function testing.  Ultrasound and physical exam show her nodule remains stable at 1.5 cm.  No changes.    Plan    1.  I told her I would recommend ongoing observation.  We can now move her out to a 2-year follow-up ultrasound per ACR guidelines.    2.  I gave her requisitions to check TSH and free T4 levels.  She said she does have an upcoming PCP appointment.  She can get those done when she does all of her other annual blood testing.         Marcos Price MD 04/09/25 8:54 AM

## 2025-04-12 DIAGNOSIS — M46.90 AXIAL SPONDYLOARTHRITIS: ICD-10-CM

## 2025-04-14 RX ORDER — MELOXICAM 7.5 MG/1
TABLET ORAL
Qty: 180 TABLET | Refills: 0 | Status: SHIPPED | OUTPATIENT
Start: 2025-04-14

## 2025-05-07 NOTE — PROGRESS NOTES
Subjective   Patient ID: Jess Paniagua is a 44 y.o. female who presents for Annual Physical and follow up on conditions.    (LAST VISIT PLAN FROM:   Last seen 06/01/2022.  END INFO FROM PRIOR VISIT)  -------------------------------------------  HPI  Health maintenance items last completed:  Colonoscopy: 03/18/2025; normal (Dr. Salazar).  Paternal uncle with colon cancer (in his 50s; still living in his 70s).  Due 03/18/2035.  We discussed that I would not be opposed to repeating in 7 years versus 10 due to family history of colon cancer.  Advised to monitor.  Mammogram: 01/27/2025; No mammographic evidence of malignancy.  BI-RADS Category:  1 Negative.  Recommendation:  Annual Screening.  Due 01/27/2026.  Bone Density: N/A.  Urine ACR (albumin creatinine ratio) or microalbumin: if HTN or DM2: N/A.  Pap: 09/09/2024; negative.  Negative HPV.  Pelvic: 09/09/2024.  Patient follows with OB/GYN, Dr. Wan, and had her IUD replaced in the last year.  Breast exam in office:  Today.  Vaccines due or not completed: Last Covid booster and influenza vaccine was 11/08/2024.  No T-dap on record.  Patient states that her last T-dap was in residency about 12 years ago or with the birth of her last child.  Patient reports having her hepatitis A and B and MMR vaccines.  Will check on the dates and update her chart.  Last Health Maintenance exam: Last seen 06/01/2022.    Patient reports having regular eye and dental examinations.    I reviewed and updated her surgical and family medical history in the chart.    Health Maintenance:  Patient is due for her tetanus vaccine.  She will double check her records ([employer]) to see if she has had one in the last 10 years and if not, will obtain. Recommended influenza and Covid booster Fall 2025.     Exercise:  For exercise, patient will run, lift weights, or row about 4 times per week for about 40 minutes.    Mild Depression:  Patient reports that her deperession is worse in the  winter but does not wish to try medication.  Advised to call if she wishes to pursue.  Recommended obtaining a light box (10,000 lux) and start using it for 20-60 minutes per day starting in October for SAD.     Arthralgias of Multiple Sites:  Axial Spondyloarthritis:  Patient follows with Rheumatology, Dr. Mckeon.  She continues on colchicine 0.6 mg daily, sulfasalazine 1000 mg X2 daily, and meloxicam 7.5 mg BID. Stable.  We discussed doing blood work twice per year due to meloxicam use.  She states her plantar fascia is the worst bothersome with intermittent SI joint, wrist and ankle pain.    Recommended calcium at 1200 mg daily between diet and supplement.  Continue vitamin D as prescribed (50 mcg daily).    History of Skin Cancer:  Stable.  Patient follows with Dermatology, Dr. Almonte, last seen 03/31/2025. I reviewed the note as follows:  Personal History of Non-Melanoma Skin Cancer, multiple, most recent 4/2023 BCC R upper arm  -Well healed scar(s) with no evidence of recurrence  -Discussed the need for annual or semi-annual skin examinations and to return sooner if any new or changing lesions are noticed. Patient verbalizes understanding.    Thyroid Nodule:  Stable.  Patient follows with Endocrinology, Dr. Price, last seen 04/09/2025.  She is s/p left thyroid lobectomy.  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  She status post left thyroid lobectomy for benign nodule in 2003 at an outside institution.  Her right lobe has a 1.5 cm nodule which remained stable.  This was biopsy-proven benign in 2022.  She has no new compressive symptoms.  She remains clinically euthyroid.  She is due for updated thyroid function testing.  Ultrasound and physical exam show her nodule remains stable at 1.5 cm.  No changes.  Plan  1.  I told her I would recommend ongoing observation.  We can now move her out to a 2-year follow-up ultrasound per ACR  guidelines.  2.  I gave her requisitions to check TSH and free T4 levels.  She said she does have an upcoming PCP appointment.  She can get those done when she does all of her other annual blood testing.  Marcos Price MD 04/09/25 8:54 AM       Electronically signed by Marcos Price MD at 4/9/2025  9:00 AM     Last thyroid US was 01/21/2025 which showed:  IMPRESSION:  Status post left thyroidectomy.  Hypoechoic apparently solid TR 5 right lobe thyroid nodule.  Please note that these statements are based on the recommendations of  the American College [employer]  TI-RADS grading system. ACR TI-RADS recommendations (apply to nodules  which have NOT been biopsied):  TR5 (?7 points) highly suspicious - FNA if ? 1cm, follow-up if 0.5  -0.9 cm every year for 5 years. Aggregate cancer risk 35%. TR4 (4-6  points) moderately suspicious - FNA if ? 1.5cm, follow-up if 1 -1.4  cm in 1, 2, 3 and 5 years. Aggregate cancer risk 9.1% TR3 (3 points)  mildly suspicious - FNA if ? 2.5cm, follow-up if 1.5 -2.4 cm in 1, 3  and 5 years. Aggregate cancer risk 4.8% TR2 (2 points) not  suspicious. No FNA or follow-up.Aggregate cancer risk 1.5% TR1 (0  points) benign - No FNA or follow-up. Aggregate cancer risk 0.3%  MACRO:  None  Signed by: Glenda Fitch 1/22/2025 4:30 PM  Dictation workstation:   [id number]    TSH and free T4 ordered by Dr. Price on 04/09/2025, to be completed with blood work required by me.  Will order TPO antibody.    Orders placed for blood work as required to be completed when she completes blood work for Dr. Mckeon and Dr. Price.    Order placed for prescription as required (Lotrisone cream).    Follow up Annually.     Review of Systems  All systems reviewed and are negative unless otherwise stated in HPI or noted in writing on the written   3  page history and review of systems document reviewed by me and  scanned in with this visit. This is scanned either to notes or to media, with today's  date.    Current Outpatient Medications   Medication Instructions    cholecalciferol (Vitamin D-3) 50 mcg (2,000 unit) capsule 1 capsule, Daily    clotrimazole-betamethasone (Lotrisone) cream 1 Application, Topical, 2 times daily    colchicine 0.6 mg, oral, Daily, As directed.    levonorgestrel (Mirena) 21 mcg/24 hours (8 yrs) 52 mg IUD Inserted 5/14/2018 by Dr. Savage BUSCH    meloxicam (Mobic) 7.5 mg tablet TAKE ONE TABLET (7.5 MG) BY MOUTH TWICE DAILY AS NEEDED    sulfaSALAzine (AZULFIDINE EN-TABS) 1,000 mg, oral, 2 times daily, Do not crush, chew, or split.    vitamin B complex (B-COMPLEX ORAL) 1 capsule, Daily     RX Allergies[1]  Objective   The following test results have been reviewed:  Latest Complete Lab Results:  CBC w/Diff:   Lab Results   Component Value Date    WBC 7.6 09/24/2024    NRBC 0.0 09/24/2024    RBC 4.43 09/24/2024    HGB 13.9 09/24/2024    HCT 41.8 09/24/2024    MCV 94 09/24/2024    MCHC 33.3 09/24/2024     09/24/2024    RDW 12.5 09/24/2024    NEUTOPHILPCT 55.4 09/24/2024    IGPCT 0.3 09/24/2024    LYMPHOPCT 31.7 09/24/2024    MONOPCT 7.5 09/24/2024    EOSPCT 4.2 09/24/2024    BASOPCT 0.9 09/24/2024    NEUTROABS 4.23 09/24/2024    LYMPHSABS 2.42 09/24/2024    MONOSABS 0.57 09/24/2024    EOSABS 0.32 09/24/2024    BASOSABS 0.07 09/24/2024        CMP:  Lab Results   Component Value Date    GLUCOSE 66 (L) 09/24/2024     09/24/2024    K 4.4 09/24/2024     09/24/2024    CO2 28 09/24/2024    ANIONGAP 10 09/24/2024    BUN 15 09/24/2024    CREATININE 0.64 09/24/2024    GFRF >90 01/10/2023    CALCIUM 9.5 09/24/2024    ALBUMIN 4.6 09/24/2024    ALKPHOS 56 09/24/2024    PROT 7.0 09/24/2024    AST 21 09/24/2024    BILITOT 0.6 09/24/2024    ALT 23 09/24/2024        Lipid:   Lab Results   Component Value Date    CHOL 139 03/25/2024    HDL 60.4 03/25/2024    CHHDL 2.3 03/25/2024    LDLF 69 06/18/2022    VLDL 9 06/18/2022    TRIG 43 06/18/2022       LDL Direct:  No results found for:  "\"LDLDIRECT\"     TSH:   Lab Results   Component Value Date    TSH 1.03 05/09/2023        B12:  Lab Results   Component Value Date    VCPASCBP10 382 06/18/2022       Vitamin D:  Lab Results   Component Value Date    VITD25 46 06/18/2022        HgA1c:   No results found for: \"HGBA1C\", \"[id number]\"    Physical Exam      3/25/2024    11:11 AM 7/8/2024    10:57 AM 9/9/2024     8:54 AM 1/20/2025    10:24 AM 1/27/2025    10:00 AM 4/9/2025     8:43 AM 5/12/2025     2:05 PM   Vitals   Systolic 112 118 110 110 113 121 104   Diastolic 69 85 72 78 72 81 70   BP Location Right arm  Right arm   Left arm    Heart Rate 60 69   66 68 52   Temp  36.6 °C (97.9 °F)   36.2 °C (97.2 °F)     Resp      16 18   Height  1.727 m (5' 8\") 1.727 m (5' 8\") 1.727 m (5' 8\") 1.727 m (5' 8\") 1.727 m (5' 8\") 1.702 m (5' 7\")   Weight (lb) 153 155 152 160 154 160 155   BMI 23.26 kg/m2 23.57 kg/m2 23.11 kg/m2 24.33 kg/m2 23.42 kg/m2 24.33 kg/m2 24.28 kg/m2   BSA (m2) 1.82 m2 1.84 m2 1.82 m2 1.87 m2 1.83 m2 1.87 m2 1.82 m2     General: Patient is known to me, is in their  usual state of health, with  no obvious distress.  Head: Normocephalic  Eyes: PERRL, EOMI, no scleral icterus or conjunctival abnormality.  Ears: Normal canals and TM's  Oropharynx: Well hydrated mucosa. no lesions, erythema. palate moves well.  Neck: No masses, no cervical (A/P/ or Lateral) adenopathy.   Thyroid: Left lobe surgically absent. Thyroid US on 01/21/2025 was stable.   Carotid pulses normal and no bruits.  Chest: Normal AP diameter. No supraclavicular adenopathy.  Lungs: Clear to auscultation: no rales , wheezes, rhonchi, rubs, examined bilaterally, ant/post /lateral. RR normal.  Heart: RRR. No MGCR S1 S2 normal.  Abdomen: BS normal, no bruits. No hepatosplenomegaly. No abdominal mass or tenderness. No distension.  Extremities: No upper extremity edema. No lower leg edema.  No ischemia.   Joints: No synovitis seen. No deformities.  Pulses: Normal posterior tibialis pulses, " normal dorsalis pedis pulses. normal radial pulses. Normal femoral pulses without bruits.  Neuro: CN 2-12 intact . Alert, oriented, appropriate.  No focal weakness observed. No tremors. Ambulation is normal .  Psych: Mood and affect normal. No cognitive deficits noted during this exam. Alert, oriented, appropriate.  Skin: No rash, petechiae or excessive bruising.  Small macular, annular rash on neck sides, bilaterally.  No rash noted on face or trunk.  Order placed for Lotrisone cream to apply twice daily.  Lymph: No SC axillary or inguinal adenopathy    Breast Exam : Symmetric appearance. No masses, nipple discharge, skin retraction, axillary or supraclavicular adenopathy.    Assessment/Plan   Problem List Items Addressed This Visit       Arthralgia of multiple sites    Personal history of other malignant neoplasm of skin - Primary    Solitary thyroid nodule    Relevant Orders    Thyroid Peroxidase (TPO) Antibody    Status post partial thyroidectomy     Other Visit Diagnoses         Rash          Dermatitis        Relevant Medications    clotrimazole-betamethasone (Lotrisone) cream          See patient instructions in wrap up plan, orders and comments for treatment plan.  Patient Instructions:    I am the Internal Medicine physician providing ongoing chronic medical care for this patient, which is managed during and in between office visits.    Scribe Attestation  By signing my name below, IMckenzie Scribe   attest that this documentation has been prepared under the direction and in the presence of Dipti Pandya MD.          [1]   Allergies  Allergen Reactions    Compazine [Prochlorperazine] Other and Unknown     dystonia      you have labs for Miquel Mckeon and Albert.  No need for cholesterol lab this year since 139 previously. Fast for this to see a fasting glucose. Hold vitamins or supplements for 24 hours prior to the thyroid blood test.    Follow up annually, sooner if needed.        I am the Internal Medicine physician providing ongoing chronic medical care for this patient, which is managed during and in between office visits.    Scribe Attestation  By signing my name below, IMckenzie Scribe   attest that this documentation has been prepared under the direction and in the presence of Dipti Pandya MD.          [1]   Allergies  Allergen Reactions    Compazine [Prochlorperazine] Other and Unknown     dystonia

## 2025-05-12 ENCOUNTER — APPOINTMENT (OUTPATIENT)
Dept: PRIMARY CARE | Facility: CLINIC | Age: 45
End: 2025-05-12
Payer: COMMERCIAL

## 2025-05-12 VITALS
DIASTOLIC BLOOD PRESSURE: 70 MMHG | RESPIRATION RATE: 18 BRPM | SYSTOLIC BLOOD PRESSURE: 104 MMHG | BODY MASS INDEX: 24.33 KG/M2 | WEIGHT: 155 LBS | HEIGHT: 67 IN | HEART RATE: 52 BPM

## 2025-05-12 DIAGNOSIS — E89.0 STATUS POST PARTIAL THYROIDECTOMY: ICD-10-CM

## 2025-05-12 DIAGNOSIS — Z00.00 ENCOUNTER FOR ANNUAL PHYSICAL EXAM: Primary | ICD-10-CM

## 2025-05-12 DIAGNOSIS — Z85.828 PERSONAL HISTORY OF OTHER MALIGNANT NEOPLASM OF SKIN: ICD-10-CM

## 2025-05-12 DIAGNOSIS — R21 RASH: ICD-10-CM

## 2025-05-12 DIAGNOSIS — L30.9 DERMATITIS: ICD-10-CM

## 2025-05-12 DIAGNOSIS — E04.1 SOLITARY THYROID NODULE: ICD-10-CM

## 2025-05-12 DIAGNOSIS — M25.50 ARTHRALGIA OF MULTIPLE SITES: ICD-10-CM

## 2025-05-12 PROCEDURE — 99396 PREV VISIT EST AGE 40-64: CPT | Performed by: INTERNAL MEDICINE

## 2025-05-12 PROCEDURE — 3008F BODY MASS INDEX DOCD: CPT | Performed by: INTERNAL MEDICINE

## 2025-05-12 RX ORDER — CLOTRIMAZOLE AND BETAMETHASONE DIPROPIONATE 10; .64 MG/G; MG/G
1 CREAM TOPICAL 2 TIMES DAILY
Qty: 45 G | Refills: 0 | Status: SHIPPED | OUTPATIENT
Start: 2025-05-12

## 2025-05-12 SDOH — HEALTH STABILITY: PHYSICAL HEALTH: ON AVERAGE, HOW MANY DAYS PER WEEK DO YOU ENGAGE IN MODERATE TO STRENUOUS EXERCISE (LIKE A BRISK WALK)?: 5 DAYS

## 2025-05-12 SDOH — ECONOMIC STABILITY: TRANSPORTATION INSECURITY
IN THE PAST 12 MONTHS, HAS LACK OF TRANSPORTATION KEPT YOU FROM MEETINGS, WORK, OR FROM GETTING THINGS NEEDED FOR DAILY LIVING?: NO

## 2025-05-12 SDOH — ECONOMIC STABILITY: FOOD INSECURITY: WITHIN THE PAST 12 MONTHS, THE FOOD YOU BOUGHT JUST DIDN'T LAST AND YOU DIDN'T HAVE MONEY TO GET MORE.: NEVER TRUE

## 2025-05-12 SDOH — HEALTH STABILITY: PHYSICAL HEALTH: ON AVERAGE, HOW MANY MINUTES DO YOU ENGAGE IN EXERCISE AT THIS LEVEL?: 90 MIN

## 2025-05-12 SDOH — ECONOMIC STABILITY: FOOD INSECURITY: WITHIN THE PAST 12 MONTHS, YOU WORRIED THAT YOUR FOOD WOULD RUN OUT BEFORE YOU GOT MONEY TO BUY MORE.: NEVER TRUE

## 2025-05-12 SDOH — ECONOMIC STABILITY: INCOME INSECURITY: IN THE LAST 12 MONTHS, WAS THERE A TIME WHEN YOU WERE NOT ABLE TO PAY THE MORTGAGE OR RENT ON TIME?: NO

## 2025-05-12 SDOH — ECONOMIC STABILITY: TRANSPORTATION INSECURITY
IN THE PAST 12 MONTHS, HAS THE LACK OF TRANSPORTATION KEPT YOU FROM MEDICAL APPOINTMENTS OR FROM GETTING MEDICATIONS?: NO

## 2025-05-12 SDOH — ECONOMIC STABILITY: GENERAL
WHICH OF THE FOLLOWING DO YOU KNOW HOW TO USE AND HAVE ACCESS TO EVERY DAY? (CHOOSE ALL THAT APPLY): DESKTOP COMPUTER, LAPTOP COMPUTER, OR TABLET WITH BROADBAND INTERNET CONNECTION;SMARTPHONE WITH CELLULAR DATA PLAN

## 2025-05-12 ASSESSMENT — ANXIETY QUESTIONNAIRES
1. FEELING NERVOUS, ANXIOUS, OR ON EDGE: NOT AT ALL
4. TROUBLE RELAXING: NOT AT ALL
5. BEING SO RESTLESS THAT IT IS HARD TO SIT STILL: NOT AT ALL
GAD7 TOTAL SCORE: 0
7. FEELING AFRAID AS IF SOMETHING AWFUL MIGHT HAPPEN: NOT AT ALL
3. WORRYING TOO MUCH ABOUT DIFFERENT THINGS: NOT AT ALL
6. BECOMING EASILY ANNOYED OR IRRITABLE: NOT AT ALL
2. NOT BEING ABLE TO STOP OR CONTROL WORRYING: NOT AT ALL
IF YOU CHECKED OFF ANY PROBLEMS ON THIS QUESTIONNAIRE, HOW DIFFICULT HAVE THESE PROBLEMS MADE IT FOR YOU TO DO YOUR WORK, TAKE CARE OF THINGS AT HOME, OR GET ALONG WITH OTHER PEOPLE: NOT DIFFICULT AT ALL

## 2025-05-12 ASSESSMENT — LIFESTYLE VARIABLES
SKIP TO QUESTIONS 9-10: 1
HOW OFTEN DO YOU HAVE SIX OR MORE DRINKS ON ONE OCCASION: NEVER
HOW OFTEN DO YOU HAVE A DRINK CONTAINING ALCOHOL: 2-4 TIMES A MONTH
AUDIT-C TOTAL SCORE: 2
HOW MANY STANDARD DRINKS CONTAINING ALCOHOL DO YOU HAVE ON A TYPICAL DAY: 1 OR 2

## 2025-05-12 ASSESSMENT — PATIENT HEALTH QUESTIONNAIRE - PHQ9
2. FEELING DOWN, DEPRESSED OR HOPELESS: SEVERAL DAYS
1. LITTLE INTEREST OR PLEASURE IN DOING THINGS: SEVERAL DAYS
10. IF YOU CHECKED OFF ANY PROBLEMS, HOW DIFFICULT HAVE THESE PROBLEMS MADE IT FOR YOU TO DO YOUR WORK, TAKE CARE OF THINGS AT HOME, OR GET ALONG WITH OTHER PEOPLE: NOT DIFFICULT AT ALL
SUM OF ALL RESPONSES TO PHQ9 QUESTIONS 1 & 2: 2

## 2025-05-12 ASSESSMENT — SOCIAL DETERMINANTS OF HEALTH (SDOH)
WITHIN THE LAST YEAR, HAVE YOU BEEN HUMILIATED OR EMOTIONALLY ABUSED IN OTHER WAYS BY YOUR PARTNER OR EX-PARTNER?: NO
WITHIN THE LAST YEAR, HAVE YOU BEEN KICKED, HIT, SLAPPED, OR OTHERWISE PHYSICALLY HURT BY YOUR PARTNER OR EX-PARTNER?: NO
WITHIN THE LAST YEAR, HAVE YOU BEEN AFRAID OF YOUR PARTNER OR EX-PARTNER?: NO
HOW OFTEN DO YOU ATTENT MEETINGS OF THE CLUB OR ORGANIZATION YOU BELONG TO?: MORE THAN 4 TIMES PER YEAR
WITHIN THE LAST YEAR, HAVE TO BEEN RAPED OR FORCED TO HAVE ANY KIND OF SEXUAL ACTIVITY BY YOUR PARTNER OR EX-PARTNER?: NO
IN THE PAST 12 MONTHS, HAS THE ELECTRIC, GAS, OIL, OR WATER COMPANY THREATENED TO SHUT OFF SERVICE IN YOUR HOME?: NO
IN A TYPICAL WEEK, HOW MANY TIMES DO YOU TALK ON THE PHONE WITH FAMILY, FRIENDS, OR NEIGHBORS?: MORE THAN THREE TIMES A WEEK
HOW HARD IS IT FOR YOU TO PAY FOR THE VERY BASICS LIKE FOOD, HOUSING, MEDICAL CARE, AND HEATING?: NOT HARD AT ALL
HOW OFTEN DO YOU ATTEND CHURCH OR RELIGIOUS SERVICES?: MORE THAN 4 TIMES PER YEAR
HOW OFTEN DO YOU GET TOGETHER WITH FRIENDS OR RELATIVES?: MORE THAN THREE TIMES A WEEK
DO YOU BELONG TO ANY CLUBS OR ORGANIZATIONS SUCH AS CHURCH GROUPS UNIONS, FRATERNAL OR ATHLETIC GROUPS, OR SCHOOL GROUPS?: YES

## 2025-05-12 ASSESSMENT — COLUMBIA-SUICIDE SEVERITY RATING SCALE - C-SSRS
1. IN THE PAST MONTH, HAVE YOU WISHED YOU WERE DEAD OR WISHED YOU COULD GO TO SLEEP AND NOT WAKE UP?: NO
2. HAVE YOU ACTUALLY HAD ANY THOUGHTS OF KILLING YOURSELF?: NO
6. HAVE YOU EVER DONE ANYTHING, STARTED TO DO ANYTHING, OR PREPARED TO DO ANYTHING TO END YOUR LIFE?: NO

## 2025-05-12 ASSESSMENT — PAIN SCALES - GENERAL: PAINLEVEL_OUTOF10: 0-NO PAIN

## 2025-05-12 NOTE — PATIENT INSTRUCTIONS
Check with [facility] about last tdap and if more than 10 years or they do not have a record, then we can give you one when you are in the building or can do at [facility].    Blood test when you obtain the ones for dr king.    Calcium total 1200 mg daily. Diet and supplement.  Keep Vitamin D where it is.    We talked about light box therapy 10,000 lux for 20-60 minutes daily for SADD, can add fluoxetine or sertraline for the winter months if preferred, call or message me if interested this fall. Would start the light box in October.    Flu and covid shots this fall.    TPO antibody blood test when you have labs for Miquel King and Albert.  No need for cholesterol lab this year since 139 previously. Fast for this to see a fasting glucose. Hold vitamins or supplements for 24 hours prior to the thyroid blood test.    Follow up annually, sooner if needed.

## 2025-05-22 LAB
ALBUMIN SERPL-MCNC: 4.2 G/DL (ref 3.6–5.1)
ALP SERPL-CCNC: 48 U/L (ref 31–125)
ALT SERPL-CCNC: 25 U/L (ref 6–29)
ANION GAP SERPL CALCULATED.4IONS-SCNC: 7 MMOL/L (CALC) (ref 7–17)
AST SERPL-CCNC: 23 U/L (ref 10–35)
BASOPHILS # BLD AUTO: 40 CELLS/UL (ref 0–200)
BASOPHILS NFR BLD AUTO: 0.7 %
BILIRUB SERPL-MCNC: 0.5 MG/DL (ref 0.2–1.2)
BUN SERPL-MCNC: 17 MG/DL (ref 7–25)
CALCIUM SERPL-MCNC: 9.2 MG/DL (ref 8.6–10.2)
CHLORIDE SERPL-SCNC: 106 MMOL/L (ref 98–110)
CO2 SERPL-SCNC: 27 MMOL/L (ref 20–32)
CREAT SERPL-MCNC: 0.75 MG/DL (ref 0.5–0.99)
EGFRCR SERPLBLD CKD-EPI 2021: 99 ML/MIN/1.73M2
EOSINOPHIL # BLD AUTO: 331 CELLS/UL (ref 15–500)
EOSINOPHIL NFR BLD AUTO: 5.8 %
ERYTHROCYTE [DISTWIDTH] IN BLOOD BY AUTOMATED COUNT: 12 % (ref 11–15)
GLUCOSE SERPL-MCNC: 83 MG/DL (ref 65–99)
HCT VFR BLD AUTO: 40.1 % (ref 35–45)
HGB BLD-MCNC: 12.9 G/DL (ref 11.7–15.5)
LYMPHOCYTES # BLD AUTO: 1630 CELLS/UL (ref 850–3900)
LYMPHOCYTES NFR BLD AUTO: 28.6 %
MCH RBC QN AUTO: 31.1 PG (ref 27–33)
MCHC RBC AUTO-ENTMCNC: 32.2 G/DL (ref 32–36)
MCV RBC AUTO: 96.6 FL (ref 80–100)
MONOCYTES # BLD AUTO: 428 CELLS/UL (ref 200–950)
MONOCYTES NFR BLD AUTO: 7.5 %
NEUTROPHILS # BLD AUTO: 3272 CELLS/UL (ref 1500–7800)
NEUTROPHILS NFR BLD AUTO: 57.4 %
PLATELET # BLD AUTO: 248 THOUSAND/UL (ref 140–400)
PMV BLD REES-ECKER: 10.4 FL (ref 7.5–12.5)
POTASSIUM SERPL-SCNC: 4.5 MMOL/L (ref 3.5–5.3)
PROT SERPL-MCNC: 6.5 G/DL (ref 6.1–8.1)
RBC # BLD AUTO: 4.15 MILLION/UL (ref 3.8–5.1)
SODIUM SERPL-SCNC: 140 MMOL/L (ref 135–146)
T4 FREE SERPL-MCNC: 0.9 NG/DL (ref 0.8–1.8)
TSH SERPL-ACNC: 1.9 MIU/L
WBC # BLD AUTO: 5.7 THOUSAND/UL (ref 3.8–10.8)

## 2025-05-23 LAB — THYROPEROXIDASE AB SERPL-ACNC: 1 IU/ML

## 2025-06-02 ENCOUNTER — APPOINTMENT (OUTPATIENT)
Dept: DERMATOLOGY | Facility: CLINIC | Age: 45
End: 2025-06-02
Payer: COMMERCIAL

## 2025-06-18 ENCOUNTER — APPOINTMENT (OUTPATIENT)
Dept: DERMATOLOGY | Facility: CLINIC | Age: 45
End: 2025-06-18
Payer: COMMERCIAL

## 2025-06-18 DIAGNOSIS — L90.5 SCAR CONDITIONS AND FIBROSIS OF SKIN: Primary | ICD-10-CM

## 2025-06-18 PROCEDURE — 99213 OFFICE O/P EST LOW 20 MIN: CPT | Performed by: DERMATOLOGY

## 2025-06-18 PROCEDURE — 1036F TOBACCO NON-USER: CPT | Performed by: DERMATOLOGY

## 2025-06-18 ASSESSMENT — DERMATOLOGY PATIENT ASSESSMENT
DO YOU HAVE ANY NEW OR CHANGING LESIONS: NO
ARE YOU AN ORGAN TRANSPLANT RECIPIENT: NO
DO YOU USE SUNSCREEN: OCCASIONALLY
HAVE YOU HAD OR DO YOU HAVE VASCULAR DISEASE: NO
FOR PATIENTS COMING IN FOR A FOLLOW-UP VISIT - HAVE THERE BEEN ANY CHANGES IN YOUR HEALTH SINCE YOUR LAST VISIT: ALL IN MYCHART
DO YOU USE A TANNING BED: NO
DO YOU HAVE IRREGULAR MENSTRUAL CYCLES: NO
ARE YOU TRYING TO GET PREGNANT: NO
HAVE YOU HAD OR DO YOU HAVE A STAPH INFECTION: NO
ARE YOU ON BIRTH CONTROL: NO

## 2025-06-18 ASSESSMENT — DERMATOLOGY QUALITY OF LIFE (QOL) ASSESSMENT
ARE THERE EXCLUSIONS OR EXCEPTIONS FOR THE QUALITY OF LIFE ASSESSMENT: NO
RATE HOW BOTHERED YOU ARE BY SYMPTOMS OF YOUR SKIN PROBLEM (EG, ITCHING, STINGING BURNING, HURTING OR SKIN IRRITATION): 2
RATE HOW BOTHERED YOU ARE BY EFFECTS OF YOUR SKIN PROBLEMS ON YOUR ACTIVITIES (EG, GOING OUT, ACCOMPLISHING WHAT YOU WANT, WORK ACTIVITIES OR YOUR RELATIONSHIPS WITH OTHERS): 2
RATE HOW EMOTIONALLY BOTHERED YOU ARE BY YOUR SKIN PROBLEM (FOR EXAMPLE, WORRY, EMBARRASSMENT, FRUSTRATION): 2

## 2025-06-18 ASSESSMENT — PATIENT GLOBAL ASSESSMENT (PGA): PATIENT GLOBAL ASSESSMENT: PATIENT GLOBAL ASSESSMENT:  2 - MILD

## 2025-06-18 ASSESSMENT — ITCH NUMERIC RATING SCALE: HOW SEVERE IS YOUR ITCHING?: 0

## 2025-06-18 NOTE — PROGRESS NOTES
Subjective     Jess Paniagua is a 45 y.o. female who presents for the following: Scar (Pt would like to discuss treatment options for surgical scar on right upper arm. 9/2023 had excision.).     Intake Questions  Do you have any new or changing Lesions?: No  Where are these new or changing lesion(s) located?: NA  For patients coming in for a Follow-up Visit:  Have there been any changes in your health since your last visit?: all in mychart  Are you an organ transplant recipient?: No  Have you had or do you have a Staph Infection?: No  Have you had or do you have Vacular Disease?: No  Do you use sunscreen?: Occasionally  Do you use a tanning bed?: No  Are you trying to get pregnant?: No  Are you on birth control?: No  Do you have irregular menstrual cycles?: No    Review of Systems:  No other skin or systemic complaints other than what is documented elsewhere in the note.    The following portions of the chart were reviewed this encounter and updated as appropriate:          Skin Cancer History  Biopsy Log Book  No skin cancers from Specimen Tracking.    Additional History      Specialty Problems          Dermatology Problems    Basal cell carcinoma of skin of other parts of face    Basal cell carcinoma of skin of right upper limb, including shoulder    Carcinoma in situ of skin of other parts of face    Hemangioma of skin and subcutaneous tissue    Melanocytic nevi of left upper limb, including shoulder    Melanocytic nevi of lower limb, including hip    Melanocytic nevi of other parts of face    Melanocytic nevi of trunk    Neoplasm of uncertain behavior of skin    Other melanin hyperpigmentation    Other seborrheic keratosis    Personal history of other malignant neoplasm of skin    Scar condition and fibrosis of skin    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    A focused skin examination was performed of  the face, right arm. All findings within normal limits unless otherwise noted below.    Assessment/Plan   Skin Exam  1. SCAR CONDITIONS AND FIBROSIS OF SKIN  Right Upper Arm - Anterior  White, widened scar at site of previous BCC excision  We discussed various treatment options for her scar including   Fraxel laser (need 3-4 treatments 6-8 weeks apart) - this may be of some benefit, however given width and white discoloration it may not be beneficial.  I do not believe the Excel V laser would be helpful given lack of redness  In addition ultimately if Fraxel laser is not helpful could consider scar revision with Dr. Torres or Dr. Rene    Follow up for Fraxel laser of right arm, given small area will not do topical anesthetic for the area. Patient will schedule during Fall/Winter    Cost reviewed - Test spot with  employee discount per each treatment.

## 2025-06-18 NOTE — Clinical Note
We discussed various treatment options for her scar including   Fraxel laser (need 3-4 treatments 6-8 weeks apart) - this may be of some benefit, however given width and white discoloration it may not be beneficial.  I do not believe the Excel V laser would be helpful given lack of redness  In addition ultimately if Fraxel laser is not helpful could consider scar revision with Dr. Torres or Dr. Rene    Follow up for Fraxel laser of right arm, given small area will not do topical anesthetic for the area. Patient will schedule during Fall/Winter    Cost reviewed - Test spot with  employee discount per each treatment.

## 2025-06-23 ENCOUNTER — APPOINTMENT (OUTPATIENT)
Dept: GASTROENTEROLOGY | Facility: HOSPITAL | Age: 45
End: 2025-06-23
Payer: COMMERCIAL

## 2025-07-11 DIAGNOSIS — M46.90 AXIAL SPONDYLOARTHRITIS: ICD-10-CM

## 2025-07-11 RX ORDER — MELOXICAM 7.5 MG/1
TABLET ORAL
Qty: 180 TABLET | Refills: 0 | Status: SHIPPED | OUTPATIENT
Start: 2025-07-11

## 2025-07-28 ENCOUNTER — APPOINTMENT (OUTPATIENT)
Dept: RHEUMATOLOGY | Facility: CLINIC | Age: 45
End: 2025-07-28
Payer: COMMERCIAL

## 2025-08-04 DIAGNOSIS — M11.20 CALCIUM PYROPHOSPHATE DEPOSITION DISEASE: ICD-10-CM

## 2025-08-04 RX ORDER — COLCHICINE 0.6 MG/1
0.6 TABLET ORAL DAILY
Qty: 90 TABLET | Refills: 0 | Status: SHIPPED | OUTPATIENT
Start: 2025-08-04

## 2025-08-23 DIAGNOSIS — M46.90 AXIAL SPONDYLOARTHRITIS: ICD-10-CM

## 2025-08-25 RX ORDER — SULFASALAZINE 500 MG/1
TABLET, DELAYED RELEASE ORAL
Qty: 240 TABLET | Refills: 0 | Status: SHIPPED | OUTPATIENT
Start: 2025-08-25

## 2025-10-01 ENCOUNTER — APPOINTMENT (OUTPATIENT)
Dept: DERMATOLOGY | Facility: CLINIC | Age: 45
End: 2025-10-01
Payer: COMMERCIAL

## 2025-10-27 ENCOUNTER — APPOINTMENT (OUTPATIENT)
Dept: RHEUMATOLOGY | Facility: CLINIC | Age: 45
End: 2025-10-27
Payer: COMMERCIAL

## 2026-01-05 ENCOUNTER — APPOINTMENT (OUTPATIENT)
Dept: RHEUMATOLOGY | Facility: CLINIC | Age: 46
End: 2026-01-05
Payer: COMMERCIAL

## 2026-04-13 ENCOUNTER — APPOINTMENT (OUTPATIENT)
Dept: DERMATOLOGY | Facility: CLINIC | Age: 46
End: 2026-04-13
Payer: COMMERCIAL